# Patient Record
Sex: FEMALE | Race: WHITE | NOT HISPANIC OR LATINO | Employment: OTHER | URBAN - METROPOLITAN AREA
[De-identification: names, ages, dates, MRNs, and addresses within clinical notes are randomized per-mention and may not be internally consistent; named-entity substitution may affect disease eponyms.]

---

## 2017-04-18 ENCOUNTER — HOSPITAL ENCOUNTER (OUTPATIENT)
Dept: ONCOLOGY | Facility: CLINIC | Age: 67
Discharge: HOME | End: 2017-04-18
Attending: INTERNAL MEDICINE | Admitting: INTERNAL MEDICINE

## 2017-04-18 LAB
ALBUMIN SERPL-MCNC: 3.8 G/DL (ref 3.5–4.8)
ALBUMIN/GLOB SERPL: 1.2 {RATIO} (ref 1–1.7)
ALP SERPL-CCNC: 55 IU/L (ref 32–91)
ALT SERPL-CCNC: 25 IU/L (ref 14–54)
ANION GAP SERPL CALC-SCNC: 11.9 MMOL/L (ref 10–20)
AST SERPL-CCNC: 26 IU/L (ref 15–41)
BILIRUB SERPL-MCNC: 0.6 MG/DL (ref 0.3–1.2)
BUN SERPL-MCNC: 10 MG/DL (ref 8–20)
BUN/CREAT SERPL: 14.3 (ref 5.4–26.2)
CALCIUM SERPL-MCNC: 9.3 MG/DL (ref 8.9–10.3)
CHLORIDE SERPL-SCNC: 107 MMOL/L (ref 101–111)
CONV CO2: 28 MMOL/L (ref 22–32)
CONV TOTAL PROTEIN: 7 G/DL (ref 6.1–7.9)
CREAT UR-MCNC: 0.7 MG/DL (ref 0.4–1)
GLOBULIN UR ELPH-MCNC: 3.2 G/DL (ref 2.5–3.8)
GLUCOSE SERPL-MCNC: 74 MG/DL (ref 65–99)
POTASSIUM SERPL-SCNC: 3.9 MMOL/L (ref 3.6–5.1)
SODIUM SERPL-SCNC: 143 MMOL/L (ref 136–144)

## 2017-11-06 ENCOUNTER — HOSPITAL ENCOUNTER (OUTPATIENT)
Dept: ONCOLOGY | Facility: CLINIC | Age: 67
Setting detail: INFUSION SERIES
Discharge: HOME | End: 2017-11-06
Attending: INTERNAL MEDICINE | Admitting: INTERNAL MEDICINE

## 2017-11-06 ENCOUNTER — CLINICAL SUPPORT (OUTPATIENT)
Dept: ONCOLOGY | Facility: HOSPITAL | Age: 67
End: 2017-11-06

## 2017-11-06 ENCOUNTER — HOSPITAL ENCOUNTER (OUTPATIENT)
Dept: ONCOLOGY | Facility: HOSPITAL | Age: 67
Discharge: HOME | End: 2017-11-06
Attending: INTERNAL MEDICINE | Admitting: INTERNAL MEDICINE

## 2017-11-06 LAB
ALBUMIN SERPL-MCNC: 4 G/DL (ref 3.5–4.8)
ALBUMIN/GLOB SERPL: 1.4 {RATIO} (ref 1–1.7)
ALP SERPL-CCNC: 44 IU/L (ref 32–91)
ALT SERPL-CCNC: 25 IU/L (ref 14–54)
ANION GAP SERPL CALC-SCNC: 11.1 MMOL/L (ref 10–20)
AST SERPL-CCNC: 30 IU/L (ref 15–41)
BILIRUB SERPL-MCNC: 0.7 MG/DL (ref 0.3–1.2)
BUN SERPL-MCNC: 17 MG/DL (ref 8–20)
BUN/CREAT SERPL: 24.3 (ref 5.4–26.2)
CALCIUM SERPL-MCNC: 9.2 MG/DL (ref 8.9–10.3)
CHLORIDE SERPL-SCNC: 105 MMOL/L (ref 101–111)
CONV CO2: 26 MMOL/L (ref 22–32)
CONV TOTAL PROTEIN: 6.8 G/DL (ref 6.1–7.9)
CREAT UR-MCNC: 0.7 MG/DL (ref 0.4–1)
GLOBULIN UR ELPH-MCNC: 2.8 G/DL (ref 2.5–3.8)
GLUCOSE SERPL-MCNC: 103 MG/DL (ref 65–99)
POTASSIUM SERPL-SCNC: 4.1 MMOL/L (ref 3.6–5.1)
SODIUM SERPL-SCNC: 138 MMOL/L (ref 136–144)

## 2017-11-06 NOTE — PROGRESS NOTES
PATIENTS ONCOLOGY RECORD LOCATED IN [facility]      Subjective     Name:  JCARLOS BOWEN     Date:  2017  Address:  [address] [place] [address] [address] [place] [address]  Home: [phone]  :  1950 AGE:  66 y.o.        RECORDS OBTAINED:  Patients Oncology Record is located in [facility]

## 2017-12-12 ENCOUNTER — HOSPITAL ENCOUNTER (OUTPATIENT)
Dept: MAMMOGRAPHY | Facility: HOSPITAL | Age: 67
Discharge: HOME | End: 2017-12-12
Attending: INTERNAL MEDICINE | Admitting: INTERNAL MEDICINE

## 2018-05-14 ENCOUNTER — CLINICAL SUPPORT (OUTPATIENT)
Dept: ONCOLOGY | Facility: HOSPITAL | Age: 68
End: 2018-05-14

## 2018-05-14 ENCOUNTER — HOSPITAL ENCOUNTER (OUTPATIENT)
Dept: ONCOLOGY | Facility: HOSPITAL | Age: 68
Discharge: HOME | End: 2018-05-14
Attending: INTERNAL MEDICINE | Admitting: INTERNAL MEDICINE

## 2018-05-14 ENCOUNTER — HOSPITAL ENCOUNTER (OUTPATIENT)
Dept: ONCOLOGY | Facility: CLINIC | Age: 68
Setting detail: INFUSION SERIES
Discharge: HOME | End: 2018-05-14
Attending: INTERNAL MEDICINE | Admitting: INTERNAL MEDICINE

## 2018-05-14 LAB
ALBUMIN SERPL-MCNC: 4 G/DL (ref 3.5–4.8)
ALBUMIN/GLOB SERPL: 1.3 {RATIO} (ref 1–1.7)
ALP SERPL-CCNC: 54 IU/L (ref 32–91)
ALT SERPL-CCNC: 24 IU/L (ref 14–54)
ANION GAP SERPL CALC-SCNC: 13 MMOL/L (ref 10–20)
AST SERPL-CCNC: 26 IU/L (ref 15–41)
BILIRUB SERPL-MCNC: 0.6 MG/DL (ref 0.3–1.2)
BUN SERPL-MCNC: 17 MG/DL (ref 8–20)
BUN/CREAT SERPL: 24.3 (ref 5.4–26.2)
CALCIUM SERPL-MCNC: 9.5 MG/DL (ref 8.9–10.3)
CHLORIDE SERPL-SCNC: 104 MMOL/L (ref 101–111)
CONV CO2: 26 MMOL/L (ref 22–32)
CONV TOTAL PROTEIN: 7.2 G/DL (ref 6.1–7.9)
CREAT UR-MCNC: 0.7 MG/DL (ref 0.4–1)
GLOBULIN UR ELPH-MCNC: 3.2 G/DL (ref 2.5–3.8)
GLUCOSE SERPL-MCNC: 99 MG/DL (ref 65–99)
IRON SATN MFR SERPL: 16 % (ref 15–50)
IRON SERPL-MCNC: 69 UG/DL (ref 28–170)
POTASSIUM SERPL-SCNC: 4 MMOL/L (ref 3.6–5.1)
SODIUM SERPL-SCNC: 139 MMOL/L (ref 136–144)
TIBC SERPL-MCNC: 426 UG/DL (ref 228–428)

## 2018-05-14 NOTE — PROGRESS NOTES
PATIENTS ONCOLOGY RECORD LOCATED IN [facility]      Subjective     Name:  JCARLOS BOWEN     Date:  2018  Address:  [address] [place], [place] [address] [place] [address]  Home: [phone]  :  1950 AGE:  67 y.o.        RECORDS OBTAINED:  Patients Oncology Record is located in [facility]

## 2018-05-16 ENCOUNTER — HOSPITAL ENCOUNTER (OUTPATIENT)
Dept: ONCOLOGY | Facility: CLINIC | Age: 68
Setting detail: INFUSION SERIES
Discharge: HOME | End: 2018-05-16
Attending: INTERNAL MEDICINE | Admitting: INTERNAL MEDICINE

## 2018-05-16 ENCOUNTER — CLINICAL SUPPORT (OUTPATIENT)
Dept: ONCOLOGY | Facility: HOSPITAL | Age: 68
End: 2018-05-16

## 2018-05-16 NOTE — PROGRESS NOTES
PATIENTS ONCOLOGY RECORD LOCATED IN [facility]      Subjective     Name:  JCARLOS BOWEN     Date:  2018  Address:  [address] [place], [place] [address] [place] [address]  Home: [phone]  :  1950 AGE:  67 y.o.        RECORDS OBTAINED:  Patients Oncology Record is located in [facility]

## 2018-05-17 ENCOUNTER — HOSPITAL ENCOUNTER (OUTPATIENT)
Dept: ONCOLOGY | Facility: CLINIC | Age: 68
Setting detail: INFUSION SERIES
Discharge: HOME | End: 2018-05-17
Attending: INTERNAL MEDICINE | Admitting: INTERNAL MEDICINE

## 2018-05-17 ENCOUNTER — CLINICAL SUPPORT (OUTPATIENT)
Dept: ONCOLOGY | Facility: HOSPITAL | Age: 68
End: 2018-05-17

## 2018-05-18 ENCOUNTER — CLINICAL SUPPORT (OUTPATIENT)
Dept: ONCOLOGY | Facility: HOSPITAL | Age: 68
End: 2018-05-18

## 2018-05-18 ENCOUNTER — HOSPITAL ENCOUNTER (OUTPATIENT)
Dept: ONCOLOGY | Facility: CLINIC | Age: 68
Setting detail: INFUSION SERIES
Discharge: HOME | End: 2018-05-18
Attending: INTERNAL MEDICINE | Admitting: INTERNAL MEDICINE

## 2018-05-18 NOTE — PROGRESS NOTES
PATIENTS ONCOLOGY RECORD LOCATED IN [facility]      Subjective     Name:  JCARLOS BOEWN     Date:  2018  Address:  [address] [place], [place] [address] [place] [address]  Home: [phone]  :  1950 AGE:  67 y.o.        RECORDS OBTAINED:  Patients Oncology Record is located in [facility] [[email or web address]]

## 2018-06-13 ENCOUNTER — OFFICE (OUTPATIENT)
Dept: URBAN - METROPOLITAN AREA CLINIC 64 | Facility: CLINIC | Age: 68
End: 2018-06-13
Payer: COMMERCIAL

## 2018-06-13 VITALS
SYSTOLIC BLOOD PRESSURE: 121 MMHG | HEART RATE: 85 BPM | DIASTOLIC BLOOD PRESSURE: 78 MMHG | HEIGHT: 64 IN | WEIGHT: 167 LBS

## 2018-06-13 DIAGNOSIS — K21.0 GASTRO-ESOPHAGEAL REFLUX DISEASE WITH ESOPHAGITIS: ICD-10-CM

## 2018-06-13 DIAGNOSIS — D64.9 ANEMIA, UNSPECIFIED: ICD-10-CM

## 2018-06-13 DIAGNOSIS — Z85.3 PERSONAL HISTORY OF MALIGNANT NEOPLASM OF BREAST: ICD-10-CM

## 2018-06-13 DIAGNOSIS — K58.0 IRRITABLE BOWEL SYNDROME WITH DIARRHEA: ICD-10-CM

## 2018-06-13 PROCEDURE — 99213 OFFICE O/P EST LOW 20 MIN: CPT | Performed by: INTERNAL MEDICINE

## 2018-06-18 ENCOUNTER — CLINICAL SUPPORT (OUTPATIENT)
Dept: ONCOLOGY | Facility: HOSPITAL | Age: 68
End: 2018-06-18

## 2018-06-18 ENCOUNTER — HOSPITAL ENCOUNTER (OUTPATIENT)
Dept: ONCOLOGY | Facility: CLINIC | Age: 68
Setting detail: INFUSION SERIES
Discharge: HOME | End: 2018-06-18
Attending: INTERNAL MEDICINE | Admitting: INTERNAL MEDICINE

## 2018-06-18 LAB — OCCULT BLOOD, FECAL, IA: NEGATIVE

## 2018-06-18 NOTE — PROGRESS NOTES
PATIENTS ONCOLOGY RECORD LOCATED IN [facility]      Subjective     Name:  JCARLOS BOWEN     Date:  2018  Address:  [address] [place], [place] [address] [place] [address]  Home: [phone]  :  1950 AGE:  67 y.o.        RECORDS OBTAINED:  Patients Oncology Record is located in [facility]

## 2018-07-18 ENCOUNTER — HOSPITAL ENCOUNTER (OUTPATIENT)
Dept: ONCOLOGY | Facility: CLINIC | Age: 68
Setting detail: INFUSION SERIES
Discharge: HOME | End: 2018-07-18
Attending: INTERNAL MEDICINE | Admitting: INTERNAL MEDICINE

## 2018-07-18 ENCOUNTER — CLINICAL SUPPORT (OUTPATIENT)
Dept: ONCOLOGY | Facility: HOSPITAL | Age: 68
End: 2018-07-18

## 2018-07-18 NOTE — PROGRESS NOTES
PATIENTS ONCOLOGY RECORD LOCATED IN [facility]      Subjective     Name:  JCARLOS BOWEN     Date:  2018  Address:  [address] [place], [place] [address] [place] [address]  Home: [phone]  :  1950 AGE:  67 y.o.        RECORDS OBTAINED:  Patients Oncology Record is located in [facility]

## 2018-08-14 ENCOUNTER — HOSPITAL ENCOUNTER (OUTPATIENT)
Dept: ONCOLOGY | Facility: CLINIC | Age: 68
Setting detail: INFUSION SERIES
Discharge: HOME | End: 2018-08-14
Attending: INTERNAL MEDICINE | Admitting: INTERNAL MEDICINE

## 2018-08-14 ENCOUNTER — CLINICAL SUPPORT (OUTPATIENT)
Dept: ONCOLOGY | Facility: HOSPITAL | Age: 68
End: 2018-08-14

## 2018-08-14 ENCOUNTER — HOSPITAL ENCOUNTER (OUTPATIENT)
Dept: ONCOLOGY | Facility: HOSPITAL | Age: 68
Discharge: HOME | End: 2018-08-14
Attending: INTERNAL MEDICINE | Admitting: INTERNAL MEDICINE

## 2018-08-14 LAB
ALBUMIN SERPL-MCNC: 3.9 G/DL (ref 3.5–4.8)
ALBUMIN/GLOB SERPL: 1.3 {RATIO} (ref 1–1.7)
ALP SERPL-CCNC: 52 IU/L (ref 32–91)
ALT SERPL-CCNC: 24 IU/L (ref 14–54)
ANION GAP SERPL CALC-SCNC: 11.9 MMOL/L (ref 10–20)
AST SERPL-CCNC: 25 IU/L (ref 15–41)
BILIRUB SERPL-MCNC: 0.6 MG/DL (ref 0.3–1.2)
BUN SERPL-MCNC: 18 MG/DL (ref 8–20)
BUN/CREAT SERPL: 30 (ref 5.4–26.2)
CALCIUM SERPL-MCNC: 9.2 MG/DL (ref 8.9–10.3)
CHLORIDE SERPL-SCNC: 106 MMOL/L (ref 101–111)
CONV CO2: 25 MMOL/L (ref 22–32)
CONV TOTAL PROTEIN: 7 G/DL (ref 6.1–7.9)
CREAT UR-MCNC: 0.6 MG/DL (ref 0.4–1)
GLOBULIN UR ELPH-MCNC: 3.1 G/DL (ref 2.5–3.8)
GLUCOSE SERPL-MCNC: 95 MG/DL (ref 65–99)
POTASSIUM SERPL-SCNC: 3.9 MMOL/L (ref 3.6–5.1)
SODIUM SERPL-SCNC: 139 MMOL/L (ref 136–144)

## 2018-08-14 NOTE — PROGRESS NOTES
PATIENTS ONCOLOGY RECORD LOCATED IN [facility]      Subjective     Name:  JCARLOS BOWEN     Date:  2018  Address:  [address] [place], [place] [address] [place] [address]  Home: [phone]  :  1950 AGE:  67 y.o.        RECORDS OBTAINED:  Patients Oncology Record is located in [facility]

## 2018-08-29 ENCOUNTER — OFFICE (OUTPATIENT)
Dept: URBAN - METROPOLITAN AREA CLINIC 64 | Facility: CLINIC | Age: 68
End: 2018-08-29
Payer: COMMERCIAL

## 2018-08-29 VITALS
DIASTOLIC BLOOD PRESSURE: 80 MMHG | SYSTOLIC BLOOD PRESSURE: 127 MMHG | WEIGHT: 163 LBS | HEIGHT: 64 IN | HEART RATE: 83 BPM

## 2018-08-29 DIAGNOSIS — D64.9 ANEMIA, UNSPECIFIED: ICD-10-CM

## 2018-08-29 PROCEDURE — 99212 OFFICE O/P EST SF 10 MIN: CPT | Performed by: INTERNAL MEDICINE

## 2018-09-11 ENCOUNTER — HOSPITAL ENCOUNTER (OUTPATIENT)
Dept: ONCOLOGY | Facility: CLINIC | Age: 68
Setting detail: INFUSION SERIES
Discharge: HOME | End: 2018-09-11
Attending: INTERNAL MEDICINE | Admitting: INTERNAL MEDICINE

## 2018-09-11 ENCOUNTER — CLINICAL SUPPORT (OUTPATIENT)
Dept: ONCOLOGY | Facility: HOSPITAL | Age: 68
End: 2018-09-11

## 2018-09-11 NOTE — PROGRESS NOTES
PATIENTS ONCOLOGY RECORD LOCATED IN [facility]      Subjective     Name:  JCARLOS BOWEN     Date:  2018  Address:  [address] [place], [place] [address] [place] [address]  Home: [phone]  :  1950 AGE:  67 y.o.        RECORDS OBTAINED:  Patients Oncology Record is located in [facility]

## 2018-10-17 ENCOUNTER — HOSPITAL ENCOUNTER (OUTPATIENT)
Dept: ONCOLOGY | Facility: CLINIC | Age: 68
Setting detail: INFUSION SERIES
Discharge: HOME | End: 2018-10-17
Attending: INTERNAL MEDICINE | Admitting: INTERNAL MEDICINE

## 2018-10-17 ENCOUNTER — CLINICAL SUPPORT (OUTPATIENT)
Dept: ONCOLOGY | Facility: HOSPITAL | Age: 68
End: 2018-10-17

## 2018-10-17 NOTE — PROGRESS NOTES
PATIENTS ONCOLOGY RECORD LOCATED IN [facility]      Subjective     Name:  JCARLOS BOWEN     Date:  10/17/2018  Address:  [address] [place], [place] [address] [place] [address]  Home: [phone]  :  1950 AGE:  67 y.o.        RECORDS OBTAINED:  Patients Oncology Record is located in [facility]

## 2018-11-13 ENCOUNTER — CLINICAL SUPPORT (OUTPATIENT)
Dept: ONCOLOGY | Facility: HOSPITAL | Age: 68
End: 2018-11-13

## 2018-11-13 ENCOUNTER — HOSPITAL ENCOUNTER (OUTPATIENT)
Dept: ONCOLOGY | Facility: CLINIC | Age: 68
Setting detail: INFUSION SERIES
Discharge: HOME | End: 2018-11-13
Attending: INTERNAL MEDICINE | Admitting: INTERNAL MEDICINE

## 2018-11-13 NOTE — PROGRESS NOTES
[facility]      Subjective     Name:  JCARLOS BOWEN     Date:  2018  Address:  [facility] [address] [place] [address]  Home: [unfilled]  :  1950 AGE:  67 y.o.        RECORDS OBTAINED:  Patients Oncology Record is located in [facility]

## 2018-12-11 ENCOUNTER — CLINICAL SUPPORT (OUTPATIENT)
Dept: ONCOLOGY | Facility: HOSPITAL | Age: 68
End: 2018-12-11

## 2018-12-11 ENCOUNTER — HOSPITAL ENCOUNTER (OUTPATIENT)
Dept: ONCOLOGY | Facility: CLINIC | Age: 68
Setting detail: INFUSION SERIES
Discharge: HOME | End: 2018-12-11
Attending: INTERNAL MEDICINE | Admitting: INTERNAL MEDICINE

## 2018-12-11 ENCOUNTER — HOSPITAL ENCOUNTER (OUTPATIENT)
Dept: ONCOLOGY | Facility: HOSPITAL | Age: 68
Discharge: HOME | End: 2018-12-11
Attending: INTERNAL MEDICINE | Admitting: INTERNAL MEDICINE

## 2018-12-11 LAB
ALBUMIN SERPL-MCNC: 3.9 G/DL (ref 3.5–4.8)
ALBUMIN/GLOB SERPL: 1.3 {RATIO} (ref 1–1.7)
ALP SERPL-CCNC: 46 IU/L (ref 32–91)
ALT SERPL-CCNC: 28 IU/L (ref 14–54)
ANION GAP SERPL CALC-SCNC: 11.2 MMOL/L (ref 10–20)
AST SERPL-CCNC: 29 IU/L (ref 15–41)
BILIRUB SERPL-MCNC: 0.1 MG/DL (ref 0.3–1.2)
BUN SERPL-MCNC: 12 MG/DL (ref 8–20)
BUN/CREAT SERPL: 17.1 (ref 5.4–26.2)
CALCIUM SERPL-MCNC: 9.2 MG/DL (ref 8.9–10.3)
CHLORIDE SERPL-SCNC: 107 MMOL/L (ref 101–111)
CONV CO2: 26 MMOL/L (ref 22–32)
CONV TOTAL PROTEIN: 6.9 G/DL (ref 6.1–7.9)
CREAT UR-MCNC: 0.7 MG/DL (ref 0.4–1)
GLOBULIN UR ELPH-MCNC: 3 G/DL (ref 2.5–3.8)
GLUCOSE SERPL-MCNC: 95 MG/DL (ref 65–99)
POTASSIUM SERPL-SCNC: 4.2 MMOL/L (ref 3.6–5.1)
SODIUM SERPL-SCNC: 140 MMOL/L (ref 136–144)

## 2018-12-11 NOTE — PROGRESS NOTES
[facility]      Subjective     Name:  JCARLOS BOWEN     Date:  2018  Address:  [facility] [address] [place] [address]  Home: [unfilled]  :  1950 AGE:  68 y.o.        RECORDS OBTAINED:  Patients Oncology Record is located in [facility]

## 2019-01-15 ENCOUNTER — HOSPITAL ENCOUNTER (OUTPATIENT)
Dept: ONCOLOGY | Facility: CLINIC | Age: 69
Setting detail: INFUSION SERIES
Discharge: HOME | End: 2019-01-15
Attending: INTERNAL MEDICINE | Admitting: INTERNAL MEDICINE

## 2019-01-15 ENCOUNTER — CLINICAL SUPPORT (OUTPATIENT)
Dept: ONCOLOGY | Facility: HOSPITAL | Age: 69
End: 2019-01-15

## 2019-01-15 NOTE — PROGRESS NOTES
[facility]      Subjective     Name:  JCARLOS BOWEN     Date:  01/15/2019  Address:  [facility] [address] [place] [address]  Home: [unfilled]  :  1950 AGE:  68 y.o.        RECORDS OBTAINED:  Patients Oncology Record is located in [facility]

## 2019-02-12 ENCOUNTER — CLINICAL SUPPORT (OUTPATIENT)
Dept: ONCOLOGY | Facility: HOSPITAL | Age: 69
End: 2019-02-12

## 2019-02-12 ENCOUNTER — HOSPITAL ENCOUNTER (OUTPATIENT)
Dept: ONCOLOGY | Facility: CLINIC | Age: 69
Setting detail: INFUSION SERIES
Discharge: HOME | End: 2019-02-12
Attending: INTERNAL MEDICINE | Admitting: INTERNAL MEDICINE

## 2019-02-12 NOTE — PROGRESS NOTES
[facility]      Subjective     Name:  JCARLOS BOWEN     Date:  2019  Address:  [facility] [address] [place] [address]  Home: [unfilled]  :  1950 AGE:  68 y.o.        RECORDS OBTAINED:  Patients Oncology Record is located in [facility]

## 2019-03-13 ENCOUNTER — HOSPITAL ENCOUNTER (OUTPATIENT)
Dept: ONCOLOGY | Facility: CLINIC | Age: 69
Setting detail: INFUSION SERIES
Discharge: HOME | End: 2019-03-13
Attending: INTERNAL MEDICINE | Admitting: INTERNAL MEDICINE

## 2019-03-13 ENCOUNTER — CLINICAL SUPPORT (OUTPATIENT)
Dept: ONCOLOGY | Facility: HOSPITAL | Age: 69
End: 2019-03-13

## 2019-03-13 ENCOUNTER — HOSPITAL ENCOUNTER (OUTPATIENT)
Dept: ONCOLOGY | Facility: HOSPITAL | Age: 69
Discharge: HOME | End: 2019-03-13
Attending: INTERNAL MEDICINE | Admitting: INTERNAL MEDICINE

## 2019-03-13 LAB
ALBUMIN SERPL-MCNC: 4.1 G/DL (ref 3.5–4.8)
ALBUMIN/GLOB SERPL: 1.3 {RATIO} (ref 1–1.7)
ALP SERPL-CCNC: 53 IU/L (ref 32–91)
ALT SERPL-CCNC: 31 IU/L (ref 14–54)
ANION GAP SERPL CALC-SCNC: 14.8 MMOL/L (ref 10–20)
AST SERPL-CCNC: 28 IU/L (ref 15–41)
BILIRUB SERPL-MCNC: 0.6 MG/DL (ref 0.3–1.2)
BUN SERPL-MCNC: 13 MG/DL (ref 8–20)
BUN/CREAT SERPL: 18.6 (ref 5.4–26.2)
CALCIUM SERPL-MCNC: 9.5 MG/DL (ref 8.9–10.3)
CHLORIDE SERPL-SCNC: 104 MMOL/L (ref 101–111)
CONV CO2: 23 MMOL/L (ref 22–32)
CONV TOTAL PROTEIN: 7.2 G/DL (ref 6.1–7.9)
CREAT UR-MCNC: 0.7 MG/DL (ref 0.4–1)
GLOBULIN UR ELPH-MCNC: 3.1 G/DL (ref 2.5–3.8)
GLUCOSE SERPL-MCNC: 101 MG/DL (ref 65–99)
POTASSIUM SERPL-SCNC: 3.8 MMOL/L (ref 3.6–5.1)
SODIUM SERPL-SCNC: 138 MMOL/L (ref 136–144)

## 2019-03-13 NOTE — PROGRESS NOTES
[facility]      Subjective     Name:  JCARLOS BOWEN     Date:  2019  Address:  [facility] [address] [place] [address]  Home: [unfilled]  :  1950 AGE:  68 y.o.        RECORDS OBTAINED:  Patients Oncology Record is located in [facility]

## 2019-04-09 ENCOUNTER — HOSPITAL ENCOUNTER (OUTPATIENT)
Dept: ONCOLOGY | Facility: CLINIC | Age: 69
Setting detail: INFUSION SERIES
Discharge: HOME | End: 2019-04-09
Attending: INTERNAL MEDICINE | Admitting: INTERNAL MEDICINE

## 2019-04-09 ENCOUNTER — CLINICAL SUPPORT (OUTPATIENT)
Dept: ONCOLOGY | Facility: HOSPITAL | Age: 69
End: 2019-04-09

## 2019-04-09 NOTE — PROGRESS NOTES
PATIENTS [facility]COL[facility]      Subjective     Name:  JCARLOS BOWEN     Date:  2019  Address:  [facility] [address] [place] [address]  Home: [unfilled]  :  1950 AGE:  68 y.o.        RECORDS OBTAINED:  Patients Oncology Record is located in [facility]

## 2019-05-07 ENCOUNTER — HOSPITAL ENCOUNTER (OUTPATIENT)
Dept: ONCOLOGY | Facility: CLINIC | Age: 69
Setting detail: INFUSION SERIES
Discharge: HOME | End: 2019-05-07
Attending: INTERNAL MEDICINE | Admitting: INTERNAL MEDICINE

## 2019-05-07 ENCOUNTER — CLINICAL SUPPORT (OUTPATIENT)
Dept: ONCOLOGY | Facility: HOSPITAL | Age: 69
End: 2019-05-07

## 2019-05-07 NOTE — PROGRESS NOTES
PATI[facility]      Subjective     Name:  JCARLOS BOWEN     Date:  2019  Address:  [facility] [address] [place] [address]  Home: [unfilled]  :  1950 AGE:  68 y.o.        RECORDS OBTAINED:  Patients Oncology Record is located in [facility]

## 2019-06-04 ENCOUNTER — HOSPITAL ENCOUNTER (OUTPATIENT)
Dept: ONCOLOGY | Facility: CLINIC | Age: 69
Setting detail: INFUSION SERIES
Discharge: HOME | End: 2019-06-04
Attending: INTERNAL MEDICINE | Admitting: INTERNAL MEDICINE

## 2019-06-19 VITALS — BODY MASS INDEX: 28.34 KG/M2 | WEIGHT: 166 LBS | HEIGHT: 64 IN

## 2019-06-19 PROBLEM — E53.8 B12 DEFICIENCY: Status: ACTIVE | Noted: 2019-06-19

## 2019-06-19 PROBLEM — M85.80 OSTEOPENIA: Status: ACTIVE | Noted: 2019-06-19

## 2019-06-20 ENCOUNTER — TELEPHONE (OUTPATIENT)
Dept: ONCOLOGY | Facility: HOSPITAL | Age: 69
End: 2019-06-20

## 2019-06-20 NOTE — TELEPHONE ENCOUNTER
Per Chanel Persaud, pt needs to have her Prolia appt changed.  Called and spoke w/ pt and she states that she will reschedule at her next visit on 7/8 when she comes in for her B12.  Appt for next week canceled and pt v/u.

## 2019-07-01 RX ORDER — LETROZOLE 2.5 MG/1
TABLET, FILM COATED ORAL
Qty: 30 TABLET | Refills: 2 | Status: SHIPPED | OUTPATIENT
Start: 2019-07-01 | End: 2020-12-03

## 2019-07-01 RX ORDER — CYANOCOBALAMIN 1000 UG/ML
INJECTION, SOLUTION INTRAMUSCULAR; SUBCUTANEOUS
Qty: 1 ML | Refills: 3 | Status: SHIPPED | OUTPATIENT
Start: 2019-07-01 | End: 2022-12-06

## 2019-07-03 DIAGNOSIS — E53.8 B12 DEFICIENCY: ICD-10-CM

## 2019-07-03 RX ORDER — CYANOCOBALAMIN 1000 UG/ML
1000 INJECTION, SOLUTION INTRAMUSCULAR; SUBCUTANEOUS
Status: CANCELLED | OUTPATIENT
Start: 2019-07-08

## 2019-07-08 ENCOUNTER — HOSPITAL ENCOUNTER (OUTPATIENT)
Dept: ONCOLOGY | Facility: HOSPITAL | Age: 69
Discharge: HOME | End: 2019-07-08
Admitting: NURSE PRACTITIONER

## 2019-07-08 VITALS
HEART RATE: 91 BPM | DIASTOLIC BLOOD PRESSURE: 83 MMHG | TEMPERATURE: 98.2 F | HEIGHT: 64 IN | RESPIRATION RATE: 18 BRPM | SYSTOLIC BLOOD PRESSURE: 149 MMHG | BODY MASS INDEX: 28.17 KG/M2 | WEIGHT: 165 LBS

## 2019-07-08 DIAGNOSIS — E53.8 B12 DEFICIENCY: Primary | ICD-10-CM

## 2019-07-08 PROCEDURE — 96372 THER/PROPH/DIAG INJ SC/IM: CPT

## 2019-07-08 PROCEDURE — 25010000002 CYANOCOBALAMIN PER 1000 MCG: Performed by: NURSE PRACTITIONER

## 2019-07-08 RX ORDER — CYANOCOBALAMIN 1000 UG/ML
1000 INJECTION, SOLUTION INTRAMUSCULAR; SUBCUTANEOUS
Status: DISCONTINUED | OUTPATIENT
Start: 2019-07-08 | End: 2019-07-09 | Stop reason: HOSPADM

## 2019-07-08 RX ADMIN — CYANOCOBALAMIN 1000 MCG: 1000 INJECTION, SOLUTION INTRAMUSCULAR; SUBCUTANEOUS at 10:00

## 2019-07-09 ENCOUNTER — TELEPHONE (OUTPATIENT)
Dept: ONCOLOGY | Facility: CLINIC | Age: 69
End: 2019-07-09

## 2019-07-09 NOTE — TELEPHONE ENCOUNTER
Refer to order 6-4-19   I faxed information over to [facility] and they will update appointment in [facility].

## 2019-08-12 DIAGNOSIS — E53.8 B12 DEFICIENCY: ICD-10-CM

## 2019-08-12 RX ORDER — CYANOCOBALAMIN 1000 UG/ML
1000 INJECTION, SOLUTION INTRAMUSCULAR; SUBCUTANEOUS
Status: CANCELLED | OUTPATIENT
Start: 2019-08-13

## 2019-08-13 ENCOUNTER — HOSPITAL ENCOUNTER (OUTPATIENT)
Dept: ONCOLOGY | Facility: HOSPITAL | Age: 69
Discharge: HOME | End: 2019-08-13
Admitting: NURSE PRACTITIONER

## 2019-08-13 VITALS
RESPIRATION RATE: 18 BRPM | WEIGHT: 164 LBS | TEMPERATURE: 98.1 F | HEIGHT: 64 IN | DIASTOLIC BLOOD PRESSURE: 88 MMHG | HEART RATE: 91 BPM | BODY MASS INDEX: 28 KG/M2 | SYSTOLIC BLOOD PRESSURE: 151 MMHG

## 2019-08-13 DIAGNOSIS — E53.8 B12 DEFICIENCY: Primary | ICD-10-CM

## 2019-08-13 PROCEDURE — 96372 THER/PROPH/DIAG INJ SC/IM: CPT | Performed by: INTERNAL MEDICINE

## 2019-08-13 PROCEDURE — 25010000002 CYANOCOBALAMIN PER 1000 MCG: Performed by: INTERNAL MEDICINE

## 2019-08-13 RX ORDER — CYANOCOBALAMIN 1000 UG/ML
1000 INJECTION, SOLUTION INTRAMUSCULAR; SUBCUTANEOUS
Status: DISCONTINUED | OUTPATIENT
Start: 2019-08-13 | End: 2019-08-14 | Stop reason: HOSPADM

## 2019-08-13 RX ADMIN — CYANOCOBALAMIN 1000 MCG: 1000 INJECTION, SOLUTION INTRAMUSCULAR; SUBCUTANEOUS at 10:02

## 2019-08-13 NOTE — ADDENDUM NOTE
Encounter addended by: Kristel Mckeon RN on: 8/13/2019 10:07 AM   Actions taken: Charge Capture section accepted

## 2019-09-09 DIAGNOSIS — M85.859 OSTEOPENIA OF NECK OF FEMUR, UNSPECIFIED LATERALITY: ICD-10-CM

## 2019-09-09 DIAGNOSIS — E53.8 B12 DEFICIENCY: ICD-10-CM

## 2019-09-09 RX ORDER — CYANOCOBALAMIN 1000 UG/ML
1000 INJECTION, SOLUTION INTRAMUSCULAR; SUBCUTANEOUS
Status: CANCELLED | OUTPATIENT
Start: 2019-10-08

## 2019-09-09 RX ORDER — CYANOCOBALAMIN 1000 UG/ML
1000 INJECTION, SOLUTION INTRAMUSCULAR; SUBCUTANEOUS
Status: CANCELLED | OUTPATIENT
Start: 2019-11-04

## 2019-09-09 RX ORDER — CYANOCOBALAMIN 1000 UG/ML
1000 INJECTION, SOLUTION INTRAMUSCULAR; SUBCUTANEOUS
Status: CANCELLED | OUTPATIENT
Start: 2019-12-02

## 2019-09-09 RX ORDER — CYANOCOBALAMIN 1000 UG/ML
1000 INJECTION, SOLUTION INTRAMUSCULAR; SUBCUTANEOUS
Status: CANCELLED | OUTPATIENT
Start: 2019-09-10

## 2019-09-10 ENCOUNTER — HOSPITAL ENCOUNTER (OUTPATIENT)
Dept: ONCOLOGY | Facility: HOSPITAL | Age: 69
Discharge: HOME | End: 2019-09-10
Admitting: NURSE PRACTITIONER

## 2019-09-10 VITALS
SYSTOLIC BLOOD PRESSURE: 139 MMHG | DIASTOLIC BLOOD PRESSURE: 81 MMHG | WEIGHT: 164 LBS | TEMPERATURE: 98 F | HEART RATE: 105 BPM | HEIGHT: 64 IN | BODY MASS INDEX: 28 KG/M2 | RESPIRATION RATE: 18 BRPM

## 2019-09-10 DIAGNOSIS — E53.8 B12 DEFICIENCY: ICD-10-CM

## 2019-09-10 DIAGNOSIS — M85.859 OSTEOPENIA OF NECK OF FEMUR, UNSPECIFIED LATERALITY: Primary | ICD-10-CM

## 2019-09-10 PROCEDURE — 25010000002 CYANOCOBALAMIN PER 1000 MCG: Performed by: INTERNAL MEDICINE

## 2019-09-10 PROCEDURE — 25010000002 DENOSUMAB 60 MG/ML SOLUTION PREFILLED SYRINGE: Performed by: NURSE PRACTITIONER

## 2019-09-10 PROCEDURE — 96372 THER/PROPH/DIAG INJ SC/IM: CPT | Performed by: INTERNAL MEDICINE

## 2019-09-10 RX ORDER — CYANOCOBALAMIN 1000 UG/ML
1000 INJECTION, SOLUTION INTRAMUSCULAR; SUBCUTANEOUS
Status: DISCONTINUED | OUTPATIENT
Start: 2019-09-10 | End: 2019-09-11 | Stop reason: HOSPADM

## 2019-09-10 RX ADMIN — DENOSUMAB 60 MG: 60 INJECTION SUBCUTANEOUS at 10:32

## 2019-09-10 RX ADMIN — CYANOCOBALAMIN 1000 MCG: 1000 INJECTION, SOLUTION INTRAMUSCULAR; SUBCUTANEOUS at 10:30

## 2019-09-30 DIAGNOSIS — C50.911 MALIGNANT NEOPLASM OF RIGHT FEMALE BREAST, UNSPECIFIED ESTROGEN RECEPTOR STATUS, UNSPECIFIED SITE OF BREAST (HCC): Primary | ICD-10-CM

## 2019-09-30 RX ORDER — LETROZOLE 2.5 MG/1
2.5 TABLET, FILM COATED ORAL DAILY
Qty: 30 TABLET | Refills: 11 | Status: SHIPPED | OUTPATIENT
Start: 2019-09-30 | End: 2019-12-03 | Stop reason: SDUPTHER

## 2019-09-30 RX ORDER — LETROZOLE 2.5 MG/1
TABLET, FILM COATED ORAL
Qty: 30 TABLET | Refills: 2 | OUTPATIENT
Start: 2019-09-30

## 2019-10-08 ENCOUNTER — HOSPITAL ENCOUNTER (OUTPATIENT)
Dept: ONCOLOGY | Facility: HOSPITAL | Age: 69
Discharge: HOME | End: 2019-10-08
Admitting: NURSE PRACTITIONER

## 2019-10-08 VITALS
BODY MASS INDEX: 28.34 KG/M2 | RESPIRATION RATE: 18 BRPM | SYSTOLIC BLOOD PRESSURE: 149 MMHG | DIASTOLIC BLOOD PRESSURE: 86 MMHG | TEMPERATURE: 98.2 F | WEIGHT: 166 LBS | HEART RATE: 88 BPM | HEIGHT: 64 IN

## 2019-10-08 DIAGNOSIS — E53.8 B12 DEFICIENCY: Primary | ICD-10-CM

## 2019-10-08 PROCEDURE — 25010000002 CYANOCOBALAMIN PER 1000 MCG: Performed by: INTERNAL MEDICINE

## 2019-10-08 PROCEDURE — 96372 THER/PROPH/DIAG INJ SC/IM: CPT | Performed by: INTERNAL MEDICINE

## 2019-10-08 RX ORDER — CYANOCOBALAMIN 1000 UG/ML
1000 INJECTION, SOLUTION INTRAMUSCULAR; SUBCUTANEOUS
Status: DISCONTINUED | OUTPATIENT
Start: 2019-10-08 | End: 2019-10-09 | Stop reason: HOSPADM

## 2019-10-08 RX ADMIN — CYANOCOBALAMIN 1000 MCG: 1000 INJECTION, SOLUTION INTRAMUSCULAR; SUBCUTANEOUS at 09:30

## 2019-11-04 ENCOUNTER — HOSPITAL ENCOUNTER (OUTPATIENT)
Dept: ONCOLOGY | Facility: HOSPITAL | Age: 69
Discharge: HOME | End: 2019-11-04
Admitting: NURSE PRACTITIONER

## 2019-11-04 VITALS
TEMPERATURE: 98 F | RESPIRATION RATE: 18 BRPM | DIASTOLIC BLOOD PRESSURE: 77 MMHG | HEART RATE: 102 BPM | HEIGHT: 64 IN | WEIGHT: 164 LBS | SYSTOLIC BLOOD PRESSURE: 106 MMHG | BODY MASS INDEX: 28 KG/M2

## 2019-11-04 DIAGNOSIS — E53.8 B12 DEFICIENCY: Primary | ICD-10-CM

## 2019-11-04 PROCEDURE — 25010000002 CYANOCOBALAMIN PER 1000 MCG: Performed by: INTERNAL MEDICINE

## 2019-11-04 PROCEDURE — 96372 THER/PROPH/DIAG INJ SC/IM: CPT | Performed by: INTERNAL MEDICINE

## 2019-11-04 RX ORDER — CYANOCOBALAMIN 1000 UG/ML
1000 INJECTION, SOLUTION INTRAMUSCULAR; SUBCUTANEOUS
Status: DISCONTINUED | OUTPATIENT
Start: 2019-11-04 | End: 2019-11-05 | Stop reason: HOSPADM

## 2019-11-04 RX ADMIN — CYANOCOBALAMIN 1000 MCG: 1000 INJECTION, SOLUTION INTRAMUSCULAR; SUBCUTANEOUS at 10:59

## 2019-12-02 NOTE — PROGRESS NOTES
Hematology/Oncology Outpatient Follow Up    PATIENT NAME:Tessa Kilpatrick  :1950  MRN: [id number]  PRIMARY CARE PHYSICIAN: Sharona Pineda MD  REFERRING PHYSICIAN: Sharona Pineda MD    Chief Complaint   Patient presents with   • Follow-up     Breast cancer   • Follow-up     Monitor drug toxicity        HISTORY OF PRESENT ILLNESS:   This is a 68-year-old female who in  was diagnosed with right breast cancer.  At the time patient was diagnosed with clinical stage III disease and underwent right modified mastectomy for invasive ductal carcinoma measuring 4.2 cm in size.  Seven out of nine lymph nodes were positive for metastatic disease.  Additional lymph node dissection revealed 6 out of 11 lymph nodes were positive for metastatic disease.  Her pathology stage was T2N3Mx.  Her right breast cancer was positive for estrogen receptor and negative for progesterone receptor, Ki-67 was positive at 36%.  HER-2/nickie by FISH was not amplified.  In 2009 patient received adjuvant chemotherapy with TAC which is combination of Adriamycin, Cytoxan and Taxotere for a total of eight cycles initiated in 3/26/09 and completed on 09.  This was subsequently followed by adjuvant radiation treatment and currently patient is on Femara since 2009.  She developed abnormal enhancement on her imaging studies and for that reason patient underwent prophylactic left mastectomy.  So far patient has tolerated Femara fairly well.  She was prescribed Arimidex briefly but due to intolerance this was discontinued.  Patient said she had her last bone density test approximately 18 months ago and this was within normal limits.  Patient has since them moved to this area and she wants to establish with medical oncology for continuity of care.    • 2009 - Patient underwent left simple mastectomy.  Pathology revealed benign breast tissue with fibrocystic changes and fibroadenoma.    • 4/22/15 - WBC 6.9,  hemoglobin 12.1, platelet count 231,000.  CMP showed normal liver function tests.  BUN 11.  Creatinine 0.6.   • 10/27/15 - Total cholesterol of 190,  (normal < 100), liver function tests (N).  • 11/3/15 - Anemia work up:  reticulocyte count (N) 0.91, B12 255, ferritin 78 (N), folate > 24, haptoglobin elevated to 207, LDH (N) 169, TIBC 404, serum iron (N) 55, iron saturation 14.  SPEP with immunofixation assay was negative for monoclonal gammopathy.    • 2/2/16 - Serum transferrin assay (N) 2.9, methylmalonic acid (N) 170.    • EGD and colonoscopy done revealed normal EGD and normal colonoscopy.  Follow up in 10 years was recommended for repeat colonoscopy.   • 3/24/16 - Comprehensive genetic analysis with [facility] which was essentially negative for any clinically significant mutation.    • 10/8/16 - Fasting lipid profile which showed a high cholesterol at 230, triglyceride (H) 226, LDL (H) 126.  Results were faxed to Dr. Pineda.  Chemistry panel:  BUN 13, creatinine 0.6.  Liver function tests (N).    • 12/12/17 - DEXA scan showed osteopenia.   • 5/14/18 - CBC:  WBC 4.9, hemoglobin 11.8, platelet count 249,000.  Differentials are 53% neutrophils, 29% lymphocytes.  There was no monocytosis, eosinophilia or basophilia.  B12 level was low-normal at 200.  Ferritin 38.  Folate normal 21.  BUN 17.  Creatinine 0.7.    • 5/16/18 - Urinalysis showed trace blood, otherwise negative.  Urine culture did not grow any significant organisms.    • 5/16/18 - Patient was placed on iron sulfate 325 mg p.o. daily and B12 injections.  She was also given a referral to GI.    • 6/13/18 - Patient seen by Dr. Donnelly.  Hemoccult stools were checked.   • Urinalysis at the last visit showed small blood.  Urine culture was negative for infection.  Patient was referred to Dr. Otilio Tong who she saw on 11/16/18.  Dr. Tong recommended CT scans of the abdomen and pelvis and also cystoscopy.    • 12/13/18 - Fasting lipid panel with total  cholesterol of 197, triglyceride 124, HDL was 60, LDL was 112.    Past Medical History:   Diagnosis Date   • Breast cancer (CMS/HCC)    • Fibromyalgia    • GERD (gastroesophageal reflux disease)    • Hypercholesteremia    • IBS (irritable bowel syndrome)    • Mitral valve prolapse    • Osteoarthritis    • Seasonal allergies        Past Surgical History:   Procedure Laterality Date   • APPENDECTOMY     • GANGLION CYST EXCISION      wrist   • HYSTERECTOMY     • MASTECTOMY           Current Outpatient Medications:   •  cyanocobalamin 1000 MCG/ML injection, INJECT 1 ML (CC) INTRAMUSCULARLY ONCE EVERY MONTH, Disp: 1 mL, Rfl: 3  •  diclofenac (VOLTAREN) 75 MG EC tablet, Take 75 mg by mouth 2 (Two) Times a Day., Disp: , Rfl: 4  •  dilTIAZem (TIAZAC) 180 MG 24 hr capsule, Take 180 mg by mouth Daily., Disp: , Rfl: 3  •  escitalopram (LEXAPRO) 10 MG tablet, Take 10 mg by mouth Daily., Disp: , Rfl: 4  •  letrozole (FEMARA) 2.5 MG tablet, TAKE 1 TABLET BY MOUTH ONCE DAILY, Disp: 30 tablet, Rfl: 2  •  montelukast (SINGULAIR) 10 MG tablet, Take 10 mg by mouth Daily., Disp: , Rfl: 3  •  omeprazole (priLOSEC) 40 MG capsule, TAKE 1 CAPSULE BY MOUTH ONCE DAILY FOR REFLUX, Disp: , Rfl: 12  •  pravastatin (PRAVACHOL) 80 MG tablet, Take 80 mg by mouth Daily., Disp: , Rfl: 4  No current facility-administered medications for this visit.     Facility-Administered Medications Ordered in Other Visits:   •  cyanocobalamin injection 1,000 mcg, 1,000 mcg, Intramuscular, Q28 Days, Lorraine Seth APRN    Allergies   Allergen Reactions   • Atenolol Unknown (See Comments)     Not known    • Codeine Unknown (See Comments)     Compounded drug    • Fiorinal [Butalbital-Aspirin-Caffeine] Unknown (See Comments)     Not known    • Morphine Unknown (See Comments)     Unknown    • Valium [Diazepam] Unknown (See Comments)     Not known        Family History   Problem Relation Age of Onset   • Breast cancer Paternal Aunt 75       Cancer-related family  "history includes Breast cancer (age of onset: 75) in her paternal aunt.    Social History     Tobacco Use   • Smoking status: Former Smoker     Types: Cigarettes   • Tobacco comment: 40 years ago    Substance Use Topics   • Alcohol use: No     Frequency: Never     Drinks per session: Patient refused     Binge frequency: Patient refused   • Drug use: No       I have reviewed the history of present illness, past medical history, family history, social history, lab results, all notes and other records since the patient was last seen on 9/28/2019.    SUBJECTIVE:  The patient is here for a follow up appointment.  The patient states that she has continued to take Femara and Oscal D.  The patient denies any new chest wall complaints.          REVIEW OF SYSTEMS:  Review of Systems   Constitutional: Negative for chills and fever.   HENT: Negative for ear pain, mouth sores, nosebleeds and sore throat.    Eyes: Negative for photophobia and visual disturbance.   Respiratory: Negative for wheezing and stridor.    Cardiovascular: Negative for chest pain and palpitations.   Gastrointestinal: Negative for abdominal pain, diarrhea, nausea and vomiting.   Endocrine: Negative for cold intolerance and heat intolerance.   Genitourinary: Negative for dysuria and hematuria.   Musculoskeletal: Negative for joint swelling and neck stiffness.   Skin: Negative for color change and rash.   Neurological: Negative for seizures and syncope.   Hematological: Negative for adenopathy.        No obvious bleeding   Psychiatric/Behavioral: Negative for agitation, confusion and hallucinations.       OBJECTIVE:    Vitals:    12/03/19 0937   BP: 138/85   Pulse: 93   Resp: 16   Temp: 98.3 °F (36.8 °C)   Weight: 75.1 kg (165 lb 9.6 oz)   Height: 162.6 cm (64\")   PainSc: 0-No pain       ECOG  (0) Fully active, able to carry on all predisease performance without restriction    Physical Exam   Constitutional: She is oriented to person, place, and time. No " distress.   HENT:   Head: Normocephalic and atraumatic.   Eyes: Conjunctivae and EOM are normal. Right eye exhibits no discharge. Left eye exhibits no discharge. No scleral icterus.   Neck: Normal range of motion. Neck supple. No thyromegaly present.   Cardiovascular: Normal rate, regular rhythm and normal heart sounds. Exam reveals no gallop and no friction rub.   Pulmonary/Chest: Effort normal. No stridor. No respiratory distress. She has no wheezes. She exhibits no mass, no tenderness, no edema and no swelling.   Bilateral mastectomy - chest wall exam was negative  Bilateral axillary exam was negative   Abdominal: Soft. Bowel sounds are normal. She exhibits no mass. There is no tenderness. There is no rebound and no guarding.   Musculoskeletal: Normal range of motion. She exhibits no tenderness.   Lymphadenopathy:     She has no cervical adenopathy.   Neurological: She is alert and oriented to person, place, and time. She exhibits normal muscle tone.   Skin: Skin is warm. No rash noted. She is not diaphoretic. No erythema.   Psychiatric: She has a normal mood and affect. Her behavior is normal.   Nursing note and vitals reviewed.      RECENT LABS  WBC   Date Value Ref Range Status   12/03/2019 6.42 3.40 - 10.80 10*3/mm3 Final     RBC   Date Value Ref Range Status   12/03/2019 4.34 3.77 - 5.28 10*6/mm3 Final     Hemoglobin   Date Value Ref Range Status   12/03/2019 12.8 12.0 - 15.9 g/dL Final     Hematocrit   Date Value Ref Range Status   12/03/2019 39.1 34.0 - 46.6 % Final     MCV   Date Value Ref Range Status   12/03/2019 90.1 79.0 - 97.0 fL Final     MCH   Date Value Ref Range Status   12/03/2019 29.5 26.6 - 33.0 pg Final     MCHC   Date Value Ref Range Status   12/03/2019 32.7 31.5 - 35.7 g/dL Final     RDW   Date Value Ref Range Status   12/03/2019 12.5 12.3 - 15.4 % Final     RDW-SD   Date Value Ref Range Status   12/03/2019 40.8 37.0 - 54.0 fl Final     MPV   Date Value Ref Range Status   12/03/2019 9.2 6.0  - 12.0 fL Final     Platelets   Date Value Ref Range Status   12/03/2019 296 140 - 450 10*3/mm3 Final     Neutrophil %   Date Value Ref Range Status   12/03/2019 57.8 42.7 - 76.0 % Final     Lymphocyte %   Date Value Ref Range Status   12/03/2019 28.7 19.6 - 45.3 % Final     Monocyte %   Date Value Ref Range Status   12/03/2019 8.7 5.0 - 12.0 % Final     Eosinophil %   Date Value Ref Range Status   12/03/2019 3.9 0.3 - 6.2 % Final     Basophil %   Date Value Ref Range Status   12/03/2019 0.9 0.0 - 1.5 % Final     Neutrophils, Absolute   Date Value Ref Range Status   12/03/2019 3.71 1.70 - 7.00 10*3/mm3 Final     Lymphocytes, Absolute   Date Value Ref Range Status   12/03/2019 1.84 0.70 - 3.10 10*3/mm3 Final     Monocytes, Absolute   Date Value Ref Range Status   12/03/2019 0.56 0.10 - 0.90 10*3/mm3 Final     Eosinophils, Absolute   Date Value Ref Range Status   12/03/2019 0.25 0.00 - 0.40 10*3/mm3 Final     Basophils, Absolute   Date Value Ref Range Status   12/03/2019 0.06 0.00 - 0.20 10*3/mm3 Final       Lab Results   Component Value Date    GLUCOSE 87 12/03/2019    BUN 13 12/03/2019    CREATININE 0.81 12/03/2019    EGFRIFNONA 71 12/03/2019    BCR 16.0 12/03/2019    K 4.0 12/03/2019    CO2 25.0 12/03/2019    CALCIUM 9.1 12/03/2019    ALBUMIN 4.50 12/03/2019    LABIL2 1.3 03/13/2019    AST 25 12/03/2019    ALT 23 12/03/2019         Assessment/Plan     Malignant neoplasm of right female breast, unspecified estrogen receptor status, unspecified site of breast (CMS/HCC)  - CBC & Differential  - Comprehensive Metabolic Panel  - CBC Auto Differential      ASSESSMENT:    1. Clinical stage III invasive ductal carcinoma involving the right breast status post right modified mastectomy followed by adjuvant chemotherapy with TAC for eight cycles, followed by adjuvant right chest wall and markie radiation and currently on Femara since November 2009.  So far patient does not have any clinical evidence of relapse disease.     2. Status post left prophylactic mastectomy with benign pathology.     3. Anemia secondary to iron deficiency and B12 deficiency, on B12 injection.   4. Comprehensive genetic analysis with [facility] was essentially negative for any clinically significant mutation.    5. Hypertriglyceridemia.    6. Osteopenia, on Prolia.    7. Hematuria.  Work up by Dr. Otilio Tong with [facility].     PLANS:     Patient will discontinue Femara, but continue Os-Phillip D .Prolia will discontinue after this month treatment.  She completed 10 years of adjuvant Femara in November 2019.   She remains off oral iron supplementation and her hemoglobin is stable. She will continue B12 injections.  I will see her again in six months.  She is due for her DEXA scan in December 2019.  If she has osteoporosis, will consider use of Evista for treatment.  I will schedule this at this time.         I have reviewed labs results, imaging, vitals, and medications with the patient today. Will follow up in 6 months with me.    Patient verbalized understanding and is in agreement of the above plan.    Part of this document was scribed by Connie Queen, [facility]KE, BSN.

## 2019-12-03 ENCOUNTER — OFFICE VISIT (OUTPATIENT)
Dept: ONCOLOGY | Facility: CLINIC | Age: 69
End: 2019-12-03

## 2019-12-03 ENCOUNTER — APPOINTMENT (OUTPATIENT)
Dept: LAB | Facility: HOSPITAL | Age: 69
End: 2019-12-03

## 2019-12-03 ENCOUNTER — HOSPITAL ENCOUNTER (OUTPATIENT)
Dept: ONCOLOGY | Facility: HOSPITAL | Age: 69
Discharge: HOME | End: 2019-12-03
Admitting: NURSE PRACTITIONER

## 2019-12-03 VITALS
HEART RATE: 93 BPM | SYSTOLIC BLOOD PRESSURE: 138 MMHG | WEIGHT: 165.6 LBS | BODY MASS INDEX: 28.27 KG/M2 | HEIGHT: 64 IN | TEMPERATURE: 98.3 F | DIASTOLIC BLOOD PRESSURE: 85 MMHG | RESPIRATION RATE: 16 BRPM

## 2019-12-03 DIAGNOSIS — M81.6 LOCALIZED OSTEOPOROSIS (LEQUESNE): ICD-10-CM

## 2019-12-03 DIAGNOSIS — M85.9 DISORDER OF BONE DENSITY AND STRUCTURE, UNSPECIFIED: ICD-10-CM

## 2019-12-03 DIAGNOSIS — C50.911 MALIGNANT NEOPLASM OF RIGHT FEMALE BREAST, UNSPECIFIED ESTROGEN RECEPTOR STATUS, UNSPECIFIED SITE OF BREAST (HCC): Primary | ICD-10-CM

## 2019-12-03 DIAGNOSIS — M85.859 OSTEOPENIA OF NECK OF FEMUR, UNSPECIFIED LATERALITY: ICD-10-CM

## 2019-12-03 DIAGNOSIS — E53.8 B12 DEFICIENCY: Primary | ICD-10-CM

## 2019-12-03 LAB
ALBUMIN SERPL-MCNC: 4.5 G/DL (ref 3.5–5.2)
ALBUMIN/GLOB SERPL: 1.5 G/DL
ALP SERPL-CCNC: 56 U/L (ref 39–117)
ALT SERPL W P-5'-P-CCNC: 23 U/L (ref 1–33)
ANION GAP SERPL CALCULATED.3IONS-SCNC: 13 MMOL/L (ref 5–15)
AST SERPL-CCNC: 25 U/L (ref 1–32)
BASOPHILS # BLD AUTO: 0.06 10*3/MM3 (ref 0–0.2)
BASOPHILS NFR BLD AUTO: 0.9 % (ref 0–1.5)
BILIRUB SERPL-MCNC: 0.4 MG/DL (ref 0.2–1.2)
BUN BLD-MCNC: 13 MG/DL (ref 8–23)
BUN/CREAT SERPL: 16 (ref 7–25)
CALCIUM SPEC-SCNC: 9.1 MG/DL (ref 8.6–10.5)
CHLORIDE SERPL-SCNC: 99 MMOL/L (ref 98–107)
CO2 SERPL-SCNC: 25 MMOL/L (ref 22–29)
CREAT BLD-MCNC: 0.81 MG/DL (ref 0.57–1)
DEPRECATED RDW RBC AUTO: 40.8 FL (ref 37–54)
EOSINOPHIL # BLD AUTO: 0.25 10*3/MM3 (ref 0–0.4)
EOSINOPHIL NFR BLD AUTO: 3.9 % (ref 0.3–6.2)
ERYTHROCYTE [DISTWIDTH] IN BLOOD BY AUTOMATED COUNT: 12.5 % (ref 12.3–15.4)
GFR SERPL CREATININE-BSD FRML MDRD: 71 ML/MIN/1.73
GLOBULIN UR ELPH-MCNC: 3.1 GM/DL
GLUCOSE BLD-MCNC: 87 MG/DL (ref 65–99)
HCT VFR BLD AUTO: 39.1 % (ref 34–46.6)
HGB BLD-MCNC: 12.8 G/DL (ref 12–15.9)
LYMPHOCYTES # BLD AUTO: 1.84 10*3/MM3 (ref 0.7–3.1)
LYMPHOCYTES NFR BLD AUTO: 28.7 % (ref 19.6–45.3)
MCH RBC QN AUTO: 29.5 PG (ref 26.6–33)
MCHC RBC AUTO-ENTMCNC: 32.7 G/DL (ref 31.5–35.7)
MCV RBC AUTO: 90.1 FL (ref 79–97)
MONOCYTES # BLD AUTO: 0.56 10*3/MM3 (ref 0.1–0.9)
MONOCYTES NFR BLD AUTO: 8.7 % (ref 5–12)
NEUTROPHILS # BLD AUTO: 3.71 10*3/MM3 (ref 1.7–7)
NEUTROPHILS NFR BLD AUTO: 57.8 % (ref 42.7–76)
PLATELET # BLD AUTO: 296 10*3/MM3 (ref 140–450)
PMV BLD AUTO: 9.2 FL (ref 6–12)
POTASSIUM BLD-SCNC: 4 MMOL/L (ref 3.5–5.2)
PROT SERPL-MCNC: 7.6 G/DL (ref 6–8.5)
RBC # BLD AUTO: 4.34 10*6/MM3 (ref 3.77–5.28)
SODIUM BLD-SCNC: 137 MMOL/L (ref 136–145)
WBC NRBC COR # BLD: 6.42 10*3/MM3 (ref 3.4–10.8)

## 2019-12-03 PROCEDURE — 96372 THER/PROPH/DIAG INJ SC/IM: CPT | Performed by: INTERNAL MEDICINE

## 2019-12-03 PROCEDURE — 85025 COMPLETE CBC W/AUTO DIFF WBC: CPT | Performed by: INTERNAL MEDICINE

## 2019-12-03 PROCEDURE — 80053 COMPREHEN METABOLIC PANEL: CPT | Performed by: INTERNAL MEDICINE

## 2019-12-03 PROCEDURE — 99215 OFFICE O/P EST HI 40 MIN: CPT | Performed by: INTERNAL MEDICINE

## 2019-12-03 PROCEDURE — 25010000002 CYANOCOBALAMIN PER 1000 MCG: Performed by: INTERNAL MEDICINE

## 2019-12-03 RX ORDER — ESCITALOPRAM OXALATE 10 MG/1
10 TABLET ORAL DAILY
Refills: 4 | COMMUNITY
Start: 2019-11-10

## 2019-12-03 RX ORDER — OMEPRAZOLE 40 MG/1
CAPSULE, DELAYED RELEASE ORAL
Refills: 12 | COMMUNITY
Start: 2019-11-17

## 2019-12-03 RX ORDER — PRAVASTATIN SODIUM 80 MG/1
80 TABLET ORAL DAILY
Refills: 4 | COMMUNITY
Start: 2019-11-29

## 2019-12-03 RX ORDER — DILTIAZEM HYDROCHLORIDE 180 MG/1
240 CAPSULE, EXTENDED RELEASE ORAL DAILY
Refills: 3 | COMMUNITY
Start: 2019-10-31 | End: 2021-06-03 | Stop reason: DRUGHIGH

## 2019-12-03 RX ORDER — CYANOCOBALAMIN 1000 UG/ML
1000 INJECTION, SOLUTION INTRAMUSCULAR; SUBCUTANEOUS
Status: DISCONTINUED | OUTPATIENT
Start: 2019-12-03 | End: 2019-12-03

## 2019-12-03 RX ORDER — DICLOFENAC SODIUM 75 MG/1
75 TABLET, DELAYED RELEASE ORAL 2 TIMES DAILY
Refills: 4 | COMMUNITY
Start: 2019-09-09

## 2019-12-03 RX ORDER — MONTELUKAST SODIUM 10 MG/1
10 TABLET ORAL DAILY
Refills: 3 | COMMUNITY
Start: 2019-09-30

## 2019-12-03 RX ORDER — CYANOCOBALAMIN 1000 UG/ML
1000 INJECTION, SOLUTION INTRAMUSCULAR; SUBCUTANEOUS
Status: CANCELLED | OUTPATIENT
Start: 2019-12-31

## 2019-12-03 RX ADMIN — CYANOCOBALAMIN 1000 MCG: 1000 INJECTION, SOLUTION INTRAMUSCULAR; SUBCUTANEOUS at 11:23

## 2019-12-27 ENCOUNTER — APPOINTMENT (OUTPATIENT)
Dept: BONE DENSITY | Facility: HOSPITAL | Age: 69
End: 2019-12-27

## 2019-12-27 ENCOUNTER — HOSPITAL ENCOUNTER (OUTPATIENT)
Dept: BONE DENSITY | Facility: HOSPITAL | Age: 69
Discharge: HOME | End: 2019-12-27
Admitting: NURSE PRACTITIONER

## 2019-12-27 DIAGNOSIS — M85.859 OSTEOPENIA OF NECK OF FEMUR, UNSPECIFIED LATERALITY: ICD-10-CM

## 2019-12-27 DIAGNOSIS — M81.6 LOCALIZED OSTEOPOROSIS (LEQUESNE): ICD-10-CM

## 2019-12-27 PROCEDURE — 77080 DXA BONE DENSITY AXIAL: CPT

## 2020-01-03 DIAGNOSIS — E53.8 B12 DEFICIENCY: ICD-10-CM

## 2020-01-03 RX ORDER — CYANOCOBALAMIN 1000 UG/ML
1000 INJECTION, SOLUTION INTRAMUSCULAR; SUBCUTANEOUS
Status: CANCELLED | OUTPATIENT
Start: 2020-01-07

## 2020-01-07 ENCOUNTER — HOSPITAL ENCOUNTER (OUTPATIENT)
Dept: ONCOLOGY | Facility: HOSPITAL | Age: 70
Discharge: HOME | End: 2020-01-07
Admitting: INTERNAL MEDICINE

## 2020-01-07 VITALS
BODY MASS INDEX: 28.2 KG/M2 | WEIGHT: 165.2 LBS | HEART RATE: 98 BPM | HEIGHT: 64 IN | TEMPERATURE: 98.5 F | RESPIRATION RATE: 18 BRPM | DIASTOLIC BLOOD PRESSURE: 81 MMHG | SYSTOLIC BLOOD PRESSURE: 139 MMHG

## 2020-01-07 DIAGNOSIS — E53.8 B12 DEFICIENCY: Primary | ICD-10-CM

## 2020-01-07 PROCEDURE — 25010000002 CYANOCOBALAMIN PER 1000 MCG: Performed by: INTERNAL MEDICINE

## 2020-01-07 PROCEDURE — 96372 THER/PROPH/DIAG INJ SC/IM: CPT | Performed by: INTERNAL MEDICINE

## 2020-01-07 RX ORDER — CYANOCOBALAMIN 1000 UG/ML
1000 INJECTION, SOLUTION INTRAMUSCULAR; SUBCUTANEOUS
Status: DISCONTINUED | OUTPATIENT
Start: 2020-01-07 | End: 2020-01-08 | Stop reason: HOSPADM

## 2020-01-07 RX ADMIN — CYANOCOBALAMIN 1000 MCG: 1000 INJECTION, SOLUTION INTRAMUSCULAR; SUBCUTANEOUS at 10:20

## 2020-02-04 ENCOUNTER — HOSPITAL ENCOUNTER (OUTPATIENT)
Dept: ONCOLOGY | Facility: HOSPITAL | Age: 70
Discharge: HOME | End: 2020-02-04
Admitting: NURSE PRACTITIONER

## 2020-02-04 VITALS
SYSTOLIC BLOOD PRESSURE: 134 MMHG | HEART RATE: 100 BPM | TEMPERATURE: 98.3 F | BODY MASS INDEX: 28.38 KG/M2 | HEIGHT: 64 IN | RESPIRATION RATE: 18 BRPM | DIASTOLIC BLOOD PRESSURE: 87 MMHG | WEIGHT: 166.2 LBS

## 2020-02-04 DIAGNOSIS — E53.8 B12 DEFICIENCY: Primary | ICD-10-CM

## 2020-02-04 PROCEDURE — 25010000002 CYANOCOBALAMIN PER 1000 MCG: Performed by: INTERNAL MEDICINE

## 2020-02-04 PROCEDURE — 96372 THER/PROPH/DIAG INJ SC/IM: CPT | Performed by: INTERNAL MEDICINE

## 2020-02-04 RX ORDER — CYANOCOBALAMIN 1000 UG/ML
1000 INJECTION, SOLUTION INTRAMUSCULAR; SUBCUTANEOUS
Status: DISCONTINUED | OUTPATIENT
Start: 2020-02-04 | End: 2020-02-05 | Stop reason: HOSPADM

## 2020-02-04 RX ADMIN — CYANOCOBALAMIN 1000 MCG: 1000 INJECTION, SOLUTION INTRAMUSCULAR; SUBCUTANEOUS at 09:32

## 2020-03-10 ENCOUNTER — HOSPITAL ENCOUNTER (OUTPATIENT)
Dept: ONCOLOGY | Facility: HOSPITAL | Age: 70
Setting detail: INFUSION SERIES
Discharge: HOME | End: 2020-03-10

## 2020-03-10 VITALS
RESPIRATION RATE: 20 BRPM | HEART RATE: 97 BPM | HEIGHT: 64 IN | BODY MASS INDEX: 28.39 KG/M2 | DIASTOLIC BLOOD PRESSURE: 79 MMHG | WEIGHT: 166.3 LBS | TEMPERATURE: 98.2 F | SYSTOLIC BLOOD PRESSURE: 144 MMHG

## 2020-03-10 DIAGNOSIS — E53.8 B12 DEFICIENCY: ICD-10-CM

## 2020-03-10 DIAGNOSIS — M85.859 OSTEOPENIA OF NECK OF FEMUR, UNSPECIFIED LATERALITY: Primary | ICD-10-CM

## 2020-03-10 LAB
ALBUMIN SERPL-MCNC: 4.1 G/DL (ref 3.5–5.2)
ALBUMIN/GLOB SERPL: 1.3 G/DL
ALP SERPL-CCNC: 57 U/L (ref 39–117)
ALT SERPL W P-5'-P-CCNC: 26 U/L (ref 1–33)
ANION GAP SERPL CALCULATED.3IONS-SCNC: 11 MMOL/L (ref 5–15)
AST SERPL-CCNC: 26 U/L (ref 1–32)
BASOPHILS # BLD AUTO: 0.08 10*3/MM3 (ref 0–0.2)
BASOPHILS NFR BLD AUTO: 1.3 % (ref 0–1.5)
BILIRUB SERPL-MCNC: 0.3 MG/DL (ref 0.2–1.2)
BUN BLD-MCNC: 14 MG/DL (ref 8–23)
BUN/CREAT SERPL: 19.2 (ref 7–25)
CALCIUM SPEC-SCNC: 9.4 MG/DL (ref 8.6–10.5)
CHLORIDE SERPL-SCNC: 104 MMOL/L (ref 98–107)
CO2 SERPL-SCNC: 25 MMOL/L (ref 22–29)
CREAT BLD-MCNC: 0.73 MG/DL (ref 0.57–1)
CREAT BLDA-MCNC: 0.8 MG/DL (ref 0.6–1.3)
DEPRECATED RDW RBC AUTO: 40 FL (ref 37–54)
EOSINOPHIL # BLD AUTO: 0.26 10*3/MM3 (ref 0–0.4)
EOSINOPHIL NFR BLD AUTO: 4.3 % (ref 0.3–6.2)
ERYTHROCYTE [DISTWIDTH] IN BLOOD BY AUTOMATED COUNT: 12.5 % (ref 12.3–15.4)
GFR SERPL CREATININE-BSD FRML MDRD: 80 ML/MIN/1.73
GLOBULIN UR ELPH-MCNC: 3.2 GM/DL
GLUCOSE BLD-MCNC: 103 MG/DL (ref 65–99)
HCT VFR BLD AUTO: 37.1 % (ref 34–46.6)
HGB BLD-MCNC: 12 G/DL (ref 12–15.9)
LYMPHOCYTES # BLD AUTO: 1.65 10*3/MM3 (ref 0.7–3.1)
LYMPHOCYTES NFR BLD AUTO: 27.5 % (ref 19.6–45.3)
MCH RBC QN AUTO: 29.1 PG (ref 26.6–33)
MCHC RBC AUTO-ENTMCNC: 32.3 G/DL (ref 31.5–35.7)
MCV RBC AUTO: 89.8 FL (ref 79–97)
MONOCYTES # BLD AUTO: 0.51 10*3/MM3 (ref 0.1–0.9)
MONOCYTES NFR BLD AUTO: 8.5 % (ref 5–12)
NEUTROPHILS # BLD AUTO: 3.51 10*3/MM3 (ref 1.7–7)
NEUTROPHILS NFR BLD AUTO: 58.4 % (ref 42.7–76)
PLATELET # BLD AUTO: 284 10*3/MM3 (ref 140–450)
PMV BLD AUTO: 9.2 FL (ref 6–12)
POTASSIUM BLD-SCNC: 4 MMOL/L (ref 3.5–5.2)
PROT SERPL-MCNC: 7.3 G/DL (ref 6–8.5)
RBC # BLD AUTO: 4.13 10*6/MM3 (ref 3.77–5.28)
SODIUM BLD-SCNC: 140 MMOL/L (ref 136–145)
WBC NRBC COR # BLD: 6.01 10*3/MM3 (ref 3.4–10.8)

## 2020-03-10 PROCEDURE — 36415 COLL VENOUS BLD VENIPUNCTURE: CPT

## 2020-03-10 PROCEDURE — 82565 ASSAY OF CREATININE: CPT

## 2020-03-10 PROCEDURE — 25010000002 CYANOCOBALAMIN PER 1000 MCG: Performed by: NURSE PRACTITIONER

## 2020-03-10 PROCEDURE — 96372 THER/PROPH/DIAG INJ SC/IM: CPT

## 2020-03-10 PROCEDURE — 85025 COMPLETE CBC W/AUTO DIFF WBC: CPT | Performed by: NURSE PRACTITIONER

## 2020-03-10 PROCEDURE — 25010000002 DENOSUMAB 60 MG/ML SOLUTION PREFILLED SYRINGE: Performed by: NURSE PRACTITIONER

## 2020-03-10 PROCEDURE — 80053 COMPREHEN METABOLIC PANEL: CPT | Performed by: NURSE PRACTITIONER

## 2020-03-10 RX ORDER — CYANOCOBALAMIN 1000 UG/ML
1000 INJECTION, SOLUTION INTRAMUSCULAR; SUBCUTANEOUS
Status: DISCONTINUED | OUTPATIENT
Start: 2020-03-10 | End: 2020-03-11 | Stop reason: HOSPADM

## 2020-03-10 RX ADMIN — DENOSUMAB 60 MG: 60 INJECTION SUBCUTANEOUS at 11:09

## 2020-03-10 RX ADMIN — CYANOCOBALAMIN 1000 MCG: 1000 INJECTION, SOLUTION INTRAMUSCULAR at 11:09

## 2020-04-07 ENCOUNTER — HOSPITAL ENCOUNTER (OUTPATIENT)
Dept: ONCOLOGY | Facility: HOSPITAL | Age: 70
Discharge: HOME | End: 2020-04-07
Admitting: INTERNAL MEDICINE

## 2020-04-07 VITALS
DIASTOLIC BLOOD PRESSURE: 74 MMHG | SYSTOLIC BLOOD PRESSURE: 134 MMHG | RESPIRATION RATE: 18 BRPM | BODY MASS INDEX: 28.65 KG/M2 | HEART RATE: 91 BPM | WEIGHT: 167.8 LBS | HEIGHT: 64 IN | TEMPERATURE: 98.9 F

## 2020-04-07 DIAGNOSIS — E53.8 B12 DEFICIENCY: Primary | ICD-10-CM

## 2020-04-07 PROCEDURE — 25010000002 CYANOCOBALAMIN PER 1000 MCG: Performed by: INTERNAL MEDICINE

## 2020-04-07 PROCEDURE — 96372 THER/PROPH/DIAG INJ SC/IM: CPT

## 2020-04-07 RX ORDER — CYANOCOBALAMIN 1000 UG/ML
1000 INJECTION, SOLUTION INTRAMUSCULAR; SUBCUTANEOUS
Status: DISCONTINUED | OUTPATIENT
Start: 2020-04-07 | End: 2020-04-08 | Stop reason: HOSPADM

## 2020-04-07 RX ADMIN — CYANOCOBALAMIN 1000 MCG: 1000 INJECTION, SOLUTION INTRAMUSCULAR at 09:45

## 2020-05-05 ENCOUNTER — HOSPITAL ENCOUNTER (OUTPATIENT)
Dept: ONCOLOGY | Facility: HOSPITAL | Age: 70
Discharge: HOME | End: 2020-05-05
Admitting: INTERNAL MEDICINE

## 2020-05-05 VITALS
WEIGHT: 167.4 LBS | HEART RATE: 90 BPM | BODY MASS INDEX: 28.58 KG/M2 | SYSTOLIC BLOOD PRESSURE: 132 MMHG | RESPIRATION RATE: 18 BRPM | TEMPERATURE: 97.1 F | DIASTOLIC BLOOD PRESSURE: 82 MMHG | HEIGHT: 64 IN

## 2020-05-05 DIAGNOSIS — E53.8 B12 DEFICIENCY: Primary | ICD-10-CM

## 2020-05-05 PROCEDURE — 25010000002 CYANOCOBALAMIN PER 1000 MCG: Performed by: INTERNAL MEDICINE

## 2020-05-05 PROCEDURE — 96372 THER/PROPH/DIAG INJ SC/IM: CPT

## 2020-05-05 RX ORDER — CYANOCOBALAMIN 1000 UG/ML
1000 INJECTION, SOLUTION INTRAMUSCULAR; SUBCUTANEOUS
Status: DISCONTINUED | OUTPATIENT
Start: 2020-05-05 | End: 2020-05-06 | Stop reason: HOSPADM

## 2020-05-05 RX ADMIN — CYANOCOBALAMIN 1000 MCG: 1000 INJECTION INTRAMUSCULAR; SUBCUTANEOUS at 09:54

## 2020-06-01 NOTE — PROGRESS NOTES
Hematology/Oncology Outpatient Follow Up    PATIENT NAME:Tessa Kilpatrick  :1950  MRN: [id number]  PRIMARY CARE PHYSICIAN: Sharona Pindea MD  REFERRING PHYSICIAN: Sharona Pineda MD    Chief Complaint   Patient presents with   • Follow-up   • Breast Cancer     Anemia        HISTORY OF PRESENT ILLNESS:     This is a 69-year-old female who in  was diagnosed with right breast cancer.  At the time patient was diagnosed with clinical stage III disease and underwent right modified mastectomy for invasive ductal carcinoma measuring 4.2 cm in size.  Seven out of nine lymph nodes were positive for metastatic disease.  Additional lymph node dissection revealed 6 out of 11 lymph nodes were positive for metastatic disease.  Her pathology stage was T2N3Mx.  Her right breast cancer was positive for estrogen receptor and negative for progesterone receptor, Ki-67 was positive at 36%.  HER-2/nickie by FISH was not amplified.  In 2009 patient received adjuvant chemotherapy with TAC which is combination of Adriamycin, Cytoxan and Taxotere for a total of eight cycles initiated in 3/26/09 and completed on 09.  This was subsequently followed by adjuvant radiation treatment and currently patient is on Femara since 2009.  She developed abnormal enhancement on her imaging studies and for that reason patient underwent prophylactic left mastectomy.  So far patient has tolerated Femara fairly well.  She was prescribed Arimidex briefly but due to intolerance this was discontinued.  Patient said she had her last bone density test approximately 18 months ago and this was within normal limits.  Patient has since them moved to this area and she wants to establish with medical oncology for continuity of care.    • 2009 - Patient underwent left simple mastectomy.  Pathology revealed benign breast tissue with fibrocystic changes and fibroadenoma.    • 4/22/15 - WBC 6.9, hemoglobin 12.1, platelet  count 231,000.  CMP showed normal liver function tests.  BUN 11.  Creatinine 0.6.   • 10/27/15 - Total cholesterol of 190,  (normal < 100), liver function tests (N).  • 11/3/15 - Anemia work up:  reticulocyte count (N) 0.91, B12 255, ferritin 78 (N), folate > 24, haptoglobin elevated to 207, LDH (N) 169, TIBC 404, serum iron (N) 55, iron saturation 14.  SPEP with immunofixation assay was negative for monoclonal gammopathy.    • 2/2/16 - Serum transferrin assay (N) 2.9, methylmalonic acid (N) 170.    • EGD and colonoscopy done revealed normal EGD and normal colonoscopy.  Follow up in 10 years was recommended for repeat colonoscopy.   • 3/24/16 - Comprehensive genetic analysis with [facility] which was essentially negative for any clinically significant mutation.    • 10/8/16 - Fasting lipid profile which showed a high cholesterol at 230, triglyceride (H) 226, LDL (H) 126.  Results were faxed to Dr. Pineda.  Chemistry panel:  BUN 13, creatinine 0.6.  Liver function tests (N).    • 12/12/17 - DEXA scan showed osteopenia.   • 5/14/18 - CBC:  WBC 4.9, hemoglobin 11.8, platelet count 249,000.  Differentials are 53% neutrophils, 29% lymphocytes.  There was no monocytosis, eosinophilia or basophilia.  B12 level was low-normal at 200.  Ferritin 38.  Folate normal 21.  BUN 17.  Creatinine 0.7.    • 5/16/18 - Urinalysis showed trace blood, otherwise negative.  Urine culture did not grow any significant organisms.    • 5/16/18 - Patient was placed on iron sulfate 325 mg p.o. daily and B12 injections.  She was also given a referral to GI.    • 6/13/18 - Patient seen by Dr. Donnelly.  Hemoccult stools were checked.   • Urinalysis at the last visit showed small blood.  Urine culture was negative for infection.  Patient was referred to Dr. Otilio Tong who she saw on 11/16/18.  Dr. Tong recommended CT scans of the abdomen and pelvis and also cystoscopy.    • 12/13/18 - Fasting lipid panel with total cholesterol of 197,  triglyceride 124, HDL was 60, LDL was 112.  • 12/27/19-Patient had bone density which showed osteopenia.    Past Medical History:   Diagnosis Date   • Breast cancer (CMS/HCC)    • Fibromyalgia    • GERD (gastroesophageal reflux disease)    • Hypercholesteremia    • IBS (irritable bowel syndrome)    • Mitral valve prolapse    • Osteoarthritis    • Seasonal allergies        Past Surgical History:   Procedure Laterality Date   • APPENDECTOMY     • GANGLION CYST EXCISION      wrist   • HYSTERECTOMY     • MASTECTOMY           Current Outpatient Medications:   •  cyanocobalamin 1000 MCG/ML injection, INJECT 1 ML (CC) INTRAMUSCULARLY ONCE EVERY MONTH, Disp: 1 mL, Rfl: 3  •  diclofenac (VOLTAREN) 75 MG EC tablet, Take 75 mg by mouth 2 (Two) Times a Day., Disp: , Rfl: 4  •  dilTIAZem (TIAZAC) 180 MG 24 hr capsule, Take 180 mg by mouth Daily., Disp: , Rfl: 3  •  escitalopram (LEXAPRO) 10 MG tablet, Take 10 mg by mouth Daily., Disp: , Rfl: 4  •  montelukast (SINGULAIR) 10 MG tablet, Take 10 mg by mouth Daily., Disp: , Rfl: 3  •  omeprazole (priLOSEC) 40 MG capsule, TAKE 1 CAPSULE BY MOUTH ONCE DAILY FOR REFLUX, Disp: , Rfl: 12  •  pravastatin (PRAVACHOL) 80 MG tablet, Take 80 mg by mouth Daily., Disp: , Rfl: 4  •  letrozole (FEMARA) 2.5 MG tablet, TAKE 1 TABLET BY MOUTH ONCE DAILY, Disp: 30 tablet, Rfl: 2    Allergies   Allergen Reactions   • Atenolol Unknown (See Comments)     Not known    • Codeine Unknown (See Comments)     Compounded drug    • Fiorinal [Butalbital-Aspirin-Caffeine] Unknown (See Comments)     Not known    • Morphine Unknown (See Comments)     Unknown    • Valium [Diazepam] Unknown (See Comments)     Not known        Family History   Problem Relation Age of Onset   • Breast cancer Paternal Aunt 75       Cancer-related family history includes Breast cancer (age of onset: 75) in her paternal aunt.    Social History     Tobacco Use   • Smoking status: Former Smoker     Types: Cigarettes   • Tobacco comment: 40  "years ago    Substance Use Topics   • Alcohol use: No     Frequency: Never     Drinks per session: Patient refused     Binge frequency: Patient refused   • Drug use: No       I have reviewed and confirmed the accuracy of the patient's history: Chief complaint, HPI and ROS as entered by the MA/LPN/RN. Elisa Jacob MD 06/02/20    SUBJECTIVE:  The patient is here for a follow up appointment.  Has no specific complaints today.  She feels well.  She denies any fevers, chills, nausea or vomiting.  She denies any chest wall lumps or nodules.          REVIEW OF SYSTEMS:  Review of Systems   Constitutional: Negative for chills and fever.   HENT: Negative for ear pain, mouth sores, nosebleeds and sore throat.    Eyes: Negative for photophobia and visual disturbance.   Respiratory: Negative for wheezing and stridor.    Cardiovascular: Negative for chest pain and palpitations.   Gastrointestinal: Negative for abdominal pain, diarrhea, nausea and vomiting.   Endocrine: Negative for cold intolerance and heat intolerance.   Genitourinary: Negative for dysuria and hematuria.   Musculoskeletal: Negative for joint swelling and neck stiffness.   Skin: Negative for color change and rash.   Neurological: Negative for seizures and syncope.   Hematological: Negative for adenopathy.        No obvious bleeding   Psychiatric/Behavioral: Negative for agitation, confusion and hallucinations.       OBJECTIVE:    Vitals:    06/02/20 0952   BP: 136/84   Pulse: 89   Resp: 18   Temp: 97.1 °F (36.2 °C)   TempSrc: Oral   Weight: 75.3 kg (166 lb)   Height: 165.1 cm (65\")   PainSc: 0-No pain       ECOG  (0) Fully active, able to carry on all predisease performance without restriction    Physical Exam   Constitutional: She is oriented to person, place, and time. No distress.   HENT:   Head: Normocephalic and atraumatic.   Eyes: Conjunctivae and EOM are normal. Right eye exhibits no discharge. Left eye exhibits no discharge. No scleral icterus. "   Neck: Normal range of motion. Neck supple. No thyromegaly present.   Cardiovascular: Normal rate, regular rhythm and normal heart sounds. Exam reveals no gallop and no friction rub.   Pulmonary/Chest: Effort normal. No stridor. No respiratory distress. She has no wheezes. She exhibits no mass, no tenderness, no edema and no swelling.   Bilateral mastectomy - chest wall exam was negative  Bilateral axillary exam was negative   Abdominal: Soft. Bowel sounds are normal. She exhibits no mass. There is no tenderness. There is no rebound and no guarding.   Musculoskeletal: Normal range of motion. She exhibits no tenderness.   Lymphadenopathy:     She has no cervical adenopathy.   Neurological: She is alert and oriented to person, place, and time. She exhibits normal muscle tone.   Skin: Skin is warm. No rash noted. She is not diaphoretic. No erythema.   Psychiatric: She has a normal mood and affect. Her behavior is normal.   Nursing note and vitals reviewed.    PE as above    RECENT LABS  WBC   Date Value Ref Range Status   06/02/2020 6.62 3.40 - 10.80 10*3/mm3 Final     RBC   Date Value Ref Range Status   06/02/2020 4.24 3.77 - 5.28 10*6/mm3 Final     Hemoglobin   Date Value Ref Range Status   06/02/2020 12.5 12.0 - 15.9 g/dL Final     Hematocrit   Date Value Ref Range Status   06/02/2020 38.3 34.0 - 46.6 % Final     MCV   Date Value Ref Range Status   06/02/2020 90.3 79.0 - 97.0 fL Final     MCH   Date Value Ref Range Status   06/02/2020 29.5 26.6 - 33.0 pg Final     MCHC   Date Value Ref Range Status   06/02/2020 32.6 31.5 - 35.7 g/dL Final     RDW   Date Value Ref Range Status   06/02/2020 12.5 12.3 - 15.4 % Final     RDW-SD   Date Value Ref Range Status   06/02/2020 40.8 37.0 - 54.0 fl Final     MPV   Date Value Ref Range Status   06/02/2020 9.1 6.0 - 12.0 fL Final     Platelets   Date Value Ref Range Status   06/02/2020 295 140 - 450 10*3/mm3 Final     Neutrophil %   Date Value Ref Range Status   06/02/2020 52.9  42.7 - 76.0 % Final     Lymphocyte %   Date Value Ref Range Status   06/02/2020 32.0 19.6 - 45.3 % Final     Monocyte %   Date Value Ref Range Status   06/02/2020 8.9 5.0 - 12.0 % Final     Eosinophil %   Date Value Ref Range Status   06/02/2020 5.6 0.3 - 6.2 % Final     Basophil %   Date Value Ref Range Status   06/02/2020 0.6 0.0 - 1.5 % Final     Neutrophils, Absolute   Date Value Ref Range Status   06/02/2020 3.50 1.70 - 7.00 10*3/mm3 Final     Lymphocytes, Absolute   Date Value Ref Range Status   06/02/2020 2.12 0.70 - 3.10 10*3/mm3 Final     Monocytes, Absolute   Date Value Ref Range Status   06/02/2020 0.59 0.10 - 0.90 10*3/mm3 Final     Eosinophils, Absolute   Date Value Ref Range Status   06/02/2020 0.37 0.00 - 0.40 10*3/mm3 Final     Basophils, Absolute   Date Value Ref Range Status   06/02/2020 0.04 0.00 - 0.20 10*3/mm3 Final       Lab Results   Component Value Date    GLUCOSE 88 06/02/2020    BUN 12 06/02/2020    CREATININE 0.66 06/02/2020    EGFRIFNONA 89 06/02/2020    BCR 18.2 06/02/2020    K 4.0 06/02/2020    CO2 28.0 06/02/2020    CALCIUM 9.4 06/02/2020    ALBUMIN 4.70 06/02/2020    LABIL2 1.3 03/13/2019    AST 31 06/02/2020    ALT 31 06/02/2020         Assessment/Plan     Malignant neoplasm of right female breast, unspecified estrogen receptor status, unspecified site of breast (CMS/[facility])  - CBC & Differential  - Comprehensive Metabolic Panel  - CBC Auto Differential      ASSESSMENT:    1. Clinical stage III invasive ductal carcinoma involving the right breast status post right modified mastectomy followed by adjuvant chemotherapy with TAC for eight cycles, followed by adjuvant right chest wall and markie radiation and currently on Femara since November 2009.  So far patient does not have any clinical evidence of relapse disease.    2. Status post left prophylactic mastectomy with benign pathology.     3. Anemia secondary to iron deficiency and B12 deficiency, on B12 injection.  Ongoing evaluation.   Stable  4. Comprehensive genetic analysis with [facility] was essentially negative for any clinically significant mutation.    5. Hypertriglyceridemia.  Primary care physician  6. Osteopenia, off Prolia now due to discontinuation of AI.    7. Hematuria.  Work up by Dr. Otilio Tong with [facility].     PLANS:     Patient  has discontinued Femara, but continue Os-Phillip D .No longer on Prolia.  She has completed 10 years of adjuvant Femara in November 2019.   She remains off oral iron supplementation and her hemoglobin is stable.   She will continue B12 injections.  I will see her again in six months.    She is due for her DEXA scan in December 2021.  If she  develops osteoporosis, will consider use of Evista for treatment.  I will schedule this at this time.    All questions answered        I have reviewed labs results, imaging, vitals, and medications with the patient today. Will follow up in 6 months with me.    Patient verbalized understanding and is in agreement of the above plan.

## 2020-06-02 ENCOUNTER — HOSPITAL ENCOUNTER (OUTPATIENT)
Dept: ONCOLOGY | Facility: HOSPITAL | Age: 70
Discharge: HOME | End: 2020-06-02
Admitting: INTERNAL MEDICINE

## 2020-06-02 ENCOUNTER — OFFICE VISIT (OUTPATIENT)
Dept: ONCOLOGY | Facility: CLINIC | Age: 70
End: 2020-06-02

## 2020-06-02 ENCOUNTER — APPOINTMENT (OUTPATIENT)
Dept: LAB | Facility: HOSPITAL | Age: 70
End: 2020-06-02

## 2020-06-02 VITALS
RESPIRATION RATE: 18 BRPM | HEIGHT: 65 IN | HEART RATE: 89 BPM | DIASTOLIC BLOOD PRESSURE: 84 MMHG | BODY MASS INDEX: 27.66 KG/M2 | TEMPERATURE: 97.1 F | WEIGHT: 166 LBS | SYSTOLIC BLOOD PRESSURE: 136 MMHG

## 2020-06-02 DIAGNOSIS — C50.911 MALIGNANT NEOPLASM OF RIGHT FEMALE BREAST, UNSPECIFIED ESTROGEN RECEPTOR STATUS, UNSPECIFIED SITE OF BREAST (HCC): Primary | ICD-10-CM

## 2020-06-02 LAB
ALBUMIN SERPL-MCNC: 4.7 G/DL (ref 3.5–5.2)
ALBUMIN/GLOB SERPL: 1.5 G/DL
ALP SERPL-CCNC: 57 U/L (ref 39–117)
ALT SERPL W P-5'-P-CCNC: 31 U/L (ref 1–33)
ANION GAP SERPL CALCULATED.3IONS-SCNC: 9 MMOL/L (ref 5–15)
AST SERPL-CCNC: 31 U/L (ref 1–32)
BASOPHILS # BLD AUTO: 0.04 10*3/MM3 (ref 0–0.2)
BASOPHILS NFR BLD AUTO: 0.6 % (ref 0–1.5)
BILIRUB SERPL-MCNC: 0.4 MG/DL (ref 0.2–1.2)
BUN BLD-MCNC: 12 MG/DL (ref 8–23)
BUN/CREAT SERPL: 18.2 (ref 7–25)
CALCIUM SPEC-SCNC: 9.4 MG/DL (ref 8.6–10.5)
CHLORIDE SERPL-SCNC: 102 MMOL/L (ref 98–107)
CO2 SERPL-SCNC: 28 MMOL/L (ref 22–29)
CREAT BLD-MCNC: 0.66 MG/DL (ref 0.57–1)
DEPRECATED RDW RBC AUTO: 40.8 FL (ref 37–54)
EOSINOPHIL # BLD AUTO: 0.37 10*3/MM3 (ref 0–0.4)
EOSINOPHIL NFR BLD AUTO: 5.6 % (ref 0.3–6.2)
ERYTHROCYTE [DISTWIDTH] IN BLOOD BY AUTOMATED COUNT: 12.5 % (ref 12.3–15.4)
GFR SERPL CREATININE-BSD FRML MDRD: 89 ML/MIN/1.73
GLOBULIN UR ELPH-MCNC: 3.1 GM/DL
GLUCOSE BLD-MCNC: 88 MG/DL (ref 65–99)
HCT VFR BLD AUTO: 38.3 % (ref 34–46.6)
HGB BLD-MCNC: 12.5 G/DL (ref 12–15.9)
LYMPHOCYTES # BLD AUTO: 2.12 10*3/MM3 (ref 0.7–3.1)
LYMPHOCYTES NFR BLD AUTO: 32 % (ref 19.6–45.3)
MCH RBC QN AUTO: 29.5 PG (ref 26.6–33)
MCHC RBC AUTO-ENTMCNC: 32.6 G/DL (ref 31.5–35.7)
MCV RBC AUTO: 90.3 FL (ref 79–97)
MONOCYTES # BLD AUTO: 0.59 10*3/MM3 (ref 0.1–0.9)
MONOCYTES NFR BLD AUTO: 8.9 % (ref 5–12)
NEUTROPHILS # BLD AUTO: 3.5 10*3/MM3 (ref 1.7–7)
NEUTROPHILS NFR BLD AUTO: 52.9 % (ref 42.7–76)
PLATELET # BLD AUTO: 295 10*3/MM3 (ref 140–450)
PMV BLD AUTO: 9.1 FL (ref 6–12)
POTASSIUM BLD-SCNC: 4 MMOL/L (ref 3.5–5.2)
PROT SERPL-MCNC: 7.8 G/DL (ref 6–8.5)
RBC # BLD AUTO: 4.24 10*6/MM3 (ref 3.77–5.28)
SODIUM BLD-SCNC: 139 MMOL/L (ref 136–145)
WBC NRBC COR # BLD: 6.62 10*3/MM3 (ref 3.4–10.8)

## 2020-06-02 PROCEDURE — 99214 OFFICE O/P EST MOD 30 MIN: CPT | Performed by: INTERNAL MEDICINE

## 2020-06-02 PROCEDURE — 85025 COMPLETE CBC W/AUTO DIFF WBC: CPT | Performed by: INTERNAL MEDICINE

## 2020-06-02 PROCEDURE — 80053 COMPREHEN METABOLIC PANEL: CPT | Performed by: INTERNAL MEDICINE

## 2020-06-02 NOTE — PROGRESS NOTES
Patient didn't receive B12 injection today. Connie MALIN RN is going to call and reschedule patient.

## 2020-09-25 ENCOUNTER — OFFICE (OUTPATIENT)
Dept: URBAN - METROPOLITAN AREA CLINIC 64 | Facility: CLINIC | Age: 70
End: 2020-09-25
Payer: COMMERCIAL

## 2020-09-25 VITALS
DIASTOLIC BLOOD PRESSURE: 83 MMHG | SYSTOLIC BLOOD PRESSURE: 129 MMHG | HEIGHT: 64 IN | HEART RATE: 96 BPM | WEIGHT: 167 LBS

## 2020-09-25 DIAGNOSIS — R11.2 NAUSEA WITH VOMITING, UNSPECIFIED: ICD-10-CM

## 2020-09-25 DIAGNOSIS — D64.9 ANEMIA, UNSPECIFIED: ICD-10-CM

## 2020-09-25 DIAGNOSIS — R19.7 DIARRHEA, UNSPECIFIED: ICD-10-CM

## 2020-09-25 PROCEDURE — 99214 OFFICE O/P EST MOD 30 MIN: CPT | Performed by: NURSE PRACTITIONER

## 2020-09-25 RX ORDER — ONDANSETRON HYDROCHLORIDE 4 MG/1
16 TABLET, FILM COATED ORAL
Qty: 20 | Refills: 6 | Status: COMPLETED
Start: 2020-09-25 | End: 2022-01-11

## 2020-12-02 NOTE — PROGRESS NOTES
Hematology/Oncology Outpatient Follow Up    PATIENT NAME:Tessa Kilpatrick  :1950  MRN: [id number]  PRIMARY CARE PHYSICIAN: Sharona Pineda MD  REFERRING PHYSICIAN: Sharona Pineda MD    Chief Complaint   Patient presents with   • Follow-up     Malignant neoplasm of right female breast, unspecified estrogen receptor status, unspecified site of breast         HISTORY OF PRESENT ILLNESS:   This is a 70 y.o.female who in  was diagnosed with right breast cancer.  At the time patient was diagnosed with clinical stage III disease and underwent right modified mastectomy for invasive ductal carcinoma measuring 4.2 cm in size.  Seven out of nine lymph nodes were positive for metastatic disease.  Additional lymph node dissection revealed 6 out of 11 lymph nodes were positive for metastatic disease.  Her pathology stage was T2N3Mx.  Her right breast cancer was positive for estrogen receptor and negative for progesterone receptor, Ki-67 was positive at 36%.  HER-2/nickie by FISH was not amplified.  In 2009 patient received adjuvant chemotherapy with TAC which is combination of Adriamycin, Cytoxan and Taxotere for a total of eight cycles initiated in 3/26/09 and completed on 09.  This was subsequently followed by adjuvant radiation treatment and currently patient is on Femara since 2009.  She developed abnormal enhancement on her imaging studies and for that reason patient underwent prophylactic left mastectomy.  So far patient has tolerated Femara fairly well.  She was prescribed Arimidex briefly but due to intolerance this was discontinued.  Patient said she had her last bone density test approximately 18 months ago and this was within normal limits.  Patient has since them moved to this area and she wants to establish with medical oncology for continuity of care.    • 2009 - Patient underwent left simple mastectomy.  Pathology revealed benign breast tissue with fibrocystic  changes and fibroadenoma.    • 4/22/15 - WBC 6.9, hemoglobin 12.1, platelet count 231,000.  CMP showed normal liver function tests.  BUN 11.  Creatinine 0.6.   • 10/27/15 - Total cholesterol of 190,  (normal < 100), liver function tests (N).  • 11/3/15 - Anemia work up:  reticulocyte count (N) 0.91, B12 255, ferritin 78 (N), folate > 24, haptoglobin elevated to 207, LDH (N) 169, TIBC 404, serum iron (N) 55, iron saturation 14.  SPEP with immunofixation assay was negative for monoclonal gammopathy.    • 2/2/16 - Serum transferrin assay (N) 2.9, methylmalonic acid (N) 170.    • EGD and colonoscopy done revealed normal EGD and normal colonoscopy.  Follow up in 10 years was recommended for repeat colonoscopy.   • 3/24/16 - Comprehensive genetic analysis with [facility] which was essentially negative for any clinically significant mutation.    • 10/8/16 - Fasting lipid profile which showed a high cholesterol at 230, triglyceride (H) 226, LDL (H) 126.  Results were faxed to Dr. Pineda.  Chemistry panel:  BUN 13, creatinine 0.6.  Liver function tests (N).    • 12/12/17 - DEXA scan showed osteopenia.   • 5/14/18 - CBC:  WBC 4.9, hemoglobin 11.8, platelet count 249,000.  Differentials are 53% neutrophils, 29% lymphocytes.  There was no monocytosis, eosinophilia or basophilia.  B12 level was low-normal at 200.  Ferritin 38.  Folate normal 21.  BUN 17.  Creatinine 0.7.    • 5/16/18 - Urinalysis showed trace blood, otherwise negative.  Urine culture did not grow any significant organisms.    • 5/16/18 - Patient was placed on iron sulfate 325 mg p.o. daily and B12 injections.  She was also given a referral to GI.    • 6/13/18 - Patient seen by Dr. Donnelly.  Hemoccult stools were checked.   • Urinalysis at the last visit showed small blood.  Urine culture was negative for infection.  Patient was referred to Dr. Otilio Tong who she saw on 11/16/18.  Dr. Tong recommended CT scans of the abdomen and pelvis and also cystoscopy.     • 12/13/18 - Fasting lipid panel with total cholesterol of 197, triglyceride 124, HDL was 60, LDL was 112.  • 7/2019 Completed 10 years Femara treatment   • 12/27/2019- Patient had bone density which showed osteopenia.    Past Medical History:   Diagnosis Date   • Breast cancer (CMS/HCC)    • Fibromyalgia    • GERD (gastroesophageal reflux disease)    • Hypercholesteremia    • IBS (irritable bowel syndrome)    • Mitral valve prolapse    • Osteoarthritis    • Seasonal allergies        Past Surgical History:   Procedure Laterality Date   • APPENDECTOMY     • GALLBLADDER SURGERY  10/27/2020    Dr. Lima    • GANGLION CYST EXCISION      wrist   • HYSTERECTOMY     • MASTECTOMY           Current Outpatient Medications:   •  cyanocobalamin 1000 MCG/ML injection, INJECT 1 ML (CC) INTRAMUSCULARLY ONCE EVERY MONTH, Disp: 1 mL, Rfl: 3  •  diclofenac (VOLTAREN) 75 MG EC tablet, Take 75 mg by mouth 2 (Two) Times a Day., Disp: , Rfl: 4  •  dilTIAZem (TIAZAC) 180 MG 24 hr capsule, Take 240 mg by mouth Daily., Disp: , Rfl: 3  •  escitalopram (LEXAPRO) 10 MG tablet, Take 10 mg by mouth Daily., Disp: , Rfl: 4  •  montelukast (SINGULAIR) 10 MG tablet, Take 10 mg by mouth Daily., Disp: , Rfl: 3  •  omeprazole (priLOSEC) 40 MG capsule, TAKE 1 CAPSULE BY MOUTH ONCE DAILY FOR REFLUX, Disp: , Rfl: 12  •  pravastatin (PRAVACHOL) 80 MG tablet, Take 80 mg by mouth Daily., Disp: , Rfl: 4    Allergies   Allergen Reactions   • Atenolol Unknown (See Comments)     Not known    • Codeine Unknown (See Comments)     Compounded drug    • Fiorinal [Butalbital-Aspirin-Caffeine] Unknown (See Comments)     Not known    • Morphine Unknown (See Comments)     Unknown    • Valium [Diazepam] Unknown (See Comments)     Not known        Family History   Problem Relation Age of Onset   • Breast cancer Paternal Aunt 75       Cancer-related family history includes Breast cancer (age of onset: 75) in her paternal aunt.    Social History     Tobacco Use   •  "Smoking status: Former Smoker     Types: Cigarettes   • Tobacco comment: 40 years ago    Substance Use Topics   • Alcohol use: No     Frequency: Never     Drinks per session: Patient refused     Binge frequency: Patient refused   • Drug use: No       HPI, ROS and PFSH have been reviewed and confirmed on 12/3/2020.     SUBJECTIVE:  The patient presented for a scheduled follow up appointment unaccompanied. She had gallbladder surgery 10/27/2020.         REVIEW OF SYSTEMS:  Review of Systems   Constitutional: Positive for fatigue. Negative for chills and fever.   HENT: Negative for ear pain, mouth sores, nosebleeds and sore throat.    Eyes: Negative for photophobia and visual disturbance.        Wears glasses    Respiratory: Negative for wheezing and stridor.         Snoring at night    Cardiovascular: Negative for chest pain and palpitations.   Gastrointestinal: Negative for abdominal pain, diarrhea, nausea and vomiting.   Endocrine: Negative for cold intolerance and heat intolerance.   Genitourinary: Negative for dysuria and hematuria.   Musculoskeletal: Negative for joint swelling and neck stiffness.   Skin: Negative for color change and rash.   Neurological: Negative for dizziness, seizures, syncope and light-headedness.   Hematological: Negative for adenopathy.        No obvious bleeding   Psychiatric/Behavioral: Negative for agitation, confusion and hallucinations.   All other systems reviewed and are negative.    OBJECTIVE:  Vitals:    12/03/20 0939   BP: 133/71   Pulse: 101   Resp: 18   Temp: 96.4 °F (35.8 °C)   Weight: 76.3 kg (168 lb 3.2 oz)   Height: 162.6 cm (64\")   PainSc: 0-No pain       ECOG  (0) Fully active, able to carry on all predisease performance without restriction    Physical Exam   Constitutional: She is oriented to person, place, and time. No distress.   HENT:   Head: Normocephalic and atraumatic.   Eyes: Conjunctivae are normal. Right eye exhibits no discharge. Left eye exhibits no discharge. " No scleral icterus.   Neck: Normal range of motion. Neck supple. No thyromegaly present.   Cardiovascular: Normal rate, regular rhythm and normal heart sounds. Exam reveals no gallop and no friction rub.   Pulmonary/Chest: Effort normal. No stridor. No respiratory distress. She has no wheezes. She exhibits no mass, no tenderness, no edema and no swelling.   Bilateral mastectomy - chest wall exam was negative  Bilateral axillary exam was negative   Abdominal: Soft. Bowel sounds are normal. She exhibits no mass. There is no abdominal tenderness. There is no rebound and no guarding.   Musculoskeletal: Normal range of motion. No tenderness.   Lymphadenopathy:     She has no cervical adenopathy.   Neurological: She is alert and oriented to person, place, and time. She exhibits normal muscle tone.   Skin: Skin is warm. No rash noted. She is not diaphoretic. No erythema.   Psychiatric: Her behavior is normal.   Nursing note and vitals reviewed.    RECENT LABS  WBC   Date Value Ref Range Status   12/03/2020 6.71 3.40 - 10.80 10*3/mm3 Final     RBC   Date Value Ref Range Status   12/03/2020 4.26 3.77 - 5.28 10*6/mm3 Final     Hemoglobin   Date Value Ref Range Status   12/03/2020 12.3 12.0 - 15.9 g/dL Final     Hematocrit   Date Value Ref Range Status   12/03/2020 38.8 34.0 - 46.6 % Final     MCV   Date Value Ref Range Status   12/03/2020 91.1 79.0 - 97.0 fL Final     MCH   Date Value Ref Range Status   12/03/2020 28.9 26.6 - 33.0 pg Final     MCHC   Date Value Ref Range Status   12/03/2020 31.7 31.5 - 35.7 g/dL Final     RDW   Date Value Ref Range Status   12/03/2020 12.9 12.3 - 15.4 % Final     RDW-SD   Date Value Ref Range Status   12/03/2020 42.5 37.0 - 54.0 fl Final     MPV   Date Value Ref Range Status   12/03/2020 9.4 6.0 - 12.0 fL Final     Platelets   Date Value Ref Range Status   12/03/2020 297 140 - 450 10*3/mm3 Final     Neutrophil %   Date Value Ref Range Status   12/03/2020 54.0 42.7 - 76.0 % Final      Lymphocyte %   Date Value Ref Range Status   12/03/2020 27.4 19.6 - 45.3 % Final     Monocyte %   Date Value Ref Range Status   12/03/2020 9.4 5.0 - 12.0 % Final     Eosinophil %   Date Value Ref Range Status   12/03/2020 8.0 (H) 0.3 - 6.2 % Final     Basophil %   Date Value Ref Range Status   12/03/2020 1.2 0.0 - 1.5 % Final     Neutrophils, Absolute   Date Value Ref Range Status   12/03/2020 3.62 1.70 - 7.00 10*3/mm3 Final     Lymphocytes, Absolute   Date Value Ref Range Status   12/03/2020 1.84 0.70 - 3.10 10*3/mm3 Final     Monocytes, Absolute   Date Value Ref Range Status   12/03/2020 0.63 0.10 - 0.90 10*3/mm3 Final     Eosinophils, Absolute   Date Value Ref Range Status   12/03/2020 0.54 (H) 0.00 - 0.40 10*3/mm3 Final     Basophils, Absolute   Date Value Ref Range Status   12/03/2020 0.08 0.00 - 0.20 10*3/mm3 Final       Lab Results   Component Value Date    GLUCOSE 88 06/02/2020    BUN 12 06/02/2020    CREATININE 0.66 06/02/2020    EGFRIFNONA 89 06/02/2020    BCR 18.2 06/02/2020    K 4.0 06/02/2020    CO2 28.0 06/02/2020    CALCIUM 9.4 06/02/2020    ALBUMIN 4.70 06/02/2020    LABIL2 1.3 03/13/2019    AST 31 06/02/2020    ALT 31 06/02/2020         Assessment/Plan     B12 deficiency  - Vitamin B12    Malignant neoplasm of right female breast, unspecified estrogen receptor status, unspecified site of breast (CMS/[facility])  - CBC & Differential  - Comprehensive Metabolic Panel    Osteopenia of neck of femur, unspecified laterality    ASSESSMENT:  1. Clinical stage III invasive ductal carcinoma involving the right breast status post right modified mastectomy followed by adjuvant chemotherapy with TAC for eight cycles, followed by adjuvant right chest wall and markie radiation and currently on Femara since November 2009.  So far patient does not have any clinical evidence of relapse disease.  Patient has d/c Femara in 7/2019.   2. Status post left prophylactic mastectomy with benign pathology.     3. Anemia secondary  to iron deficiency and B12 deficiency, on B12 injection.  Off iron supplementation   4. Comprehensive genetic analysis with [facility] was essentially negative for any clinically significant mutation.    5. Hypertriglyceridemia.  Primary care physician  6. Osteopenia, off Prolia now due to discontinuation of AI.   Continued on calcium + vitamin D.   7. Hematuria.  Work up by Dr. Otilio Tong with [facility].     PLANS:  1. CBC, CMP, and vitamin B12   2. Continue OsCal  3. Patient is off B12 injections at this time   4. Consider Evista if patient develops osteoporosis   5. DEXA scan due 12/2021   6. Continue breast self examination   7. Advised to continue social isolation/distancing due to the ongoing coronavirus pandemic.    8. Patient had flu vaccine 10/2020  9. RTC in 6 months to see Elisa Jacob MD       I have reviewed labs results, imaging, vitals, and medications with the patient today.  CBC is normal today and was reviewed with the patient.    Lab Results   Component Value Date    WBC 6.71 12/03/2020    HGB 12.3 12/03/2020    HCT 38.8 12/03/2020    MCV 91.1 12/03/2020     12/03/2020     Patient verbalized understanding and is in agreement of the above plan.    Electronically signed by VICENTE Walker, 12/03/20, 10:03 AM EST.  .

## 2020-12-03 ENCOUNTER — LAB (OUTPATIENT)
Dept: LAB | Facility: HOSPITAL | Age: 70
End: 2020-12-03

## 2020-12-03 ENCOUNTER — OFFICE VISIT (OUTPATIENT)
Dept: ONCOLOGY | Facility: CLINIC | Age: 70
End: 2020-12-03

## 2020-12-03 VITALS
HEART RATE: 101 BPM | TEMPERATURE: 96.4 F | DIASTOLIC BLOOD PRESSURE: 71 MMHG | SYSTOLIC BLOOD PRESSURE: 133 MMHG | HEIGHT: 64 IN | BODY MASS INDEX: 28.71 KG/M2 | RESPIRATION RATE: 18 BRPM | WEIGHT: 168.2 LBS

## 2020-12-03 DIAGNOSIS — C50.911 MALIGNANT NEOPLASM OF RIGHT FEMALE BREAST, UNSPECIFIED ESTROGEN RECEPTOR STATUS, UNSPECIFIED SITE OF BREAST (HCC): ICD-10-CM

## 2020-12-03 DIAGNOSIS — M85.859 OSTEOPENIA OF NECK OF FEMUR, UNSPECIFIED LATERALITY: ICD-10-CM

## 2020-12-03 DIAGNOSIS — E53.8 B12 DEFICIENCY: Primary | ICD-10-CM

## 2020-12-03 DIAGNOSIS — E53.8 B12 DEFICIENCY: ICD-10-CM

## 2020-12-03 LAB
ALBUMIN SERPL-MCNC: 4.5 G/DL (ref 3.5–5.2)
ALBUMIN/GLOB SERPL: 1.9 G/DL
ALP SERPL-CCNC: 70 U/L (ref 39–117)
ALT SERPL W P-5'-P-CCNC: 40 U/L (ref 1–33)
ANION GAP SERPL CALCULATED.3IONS-SCNC: 9 MMOL/L (ref 5–15)
AST SERPL-CCNC: 43 U/L (ref 1–32)
BASOPHILS # BLD AUTO: 0.08 10*3/MM3 (ref 0–0.2)
BASOPHILS NFR BLD AUTO: 1.2 % (ref 0–1.5)
BILIRUB SERPL-MCNC: 0.3 MG/DL (ref 0–1.2)
BUN SERPL-MCNC: 16 MG/DL (ref 8–23)
BUN/CREAT SERPL: 23.2 (ref 7–25)
CALCIUM SPEC-SCNC: 9.3 MG/DL (ref 8.6–10.5)
CHLORIDE SERPL-SCNC: 103 MMOL/L (ref 98–107)
CO2 SERPL-SCNC: 29 MMOL/L (ref 22–29)
CREAT SERPL-MCNC: 0.69 MG/DL (ref 0.57–1)
DEPRECATED RDW RBC AUTO: 42.5 FL (ref 37–54)
EOSINOPHIL # BLD AUTO: 0.54 10*3/MM3 (ref 0–0.4)
EOSINOPHIL NFR BLD AUTO: 8 % (ref 0.3–6.2)
ERYTHROCYTE [DISTWIDTH] IN BLOOD BY AUTOMATED COUNT: 12.9 % (ref 12.3–15.4)
GFR SERPL CREATININE-BSD FRML MDRD: 85 ML/MIN/1.73
GLOBULIN UR ELPH-MCNC: 2.4 GM/DL
GLUCOSE SERPL-MCNC: 104 MG/DL (ref 65–99)
HCT VFR BLD AUTO: 38.8 % (ref 34–46.6)
HGB BLD-MCNC: 12.3 G/DL (ref 12–15.9)
LYMPHOCYTES # BLD AUTO: 1.84 10*3/MM3 (ref 0.7–3.1)
LYMPHOCYTES NFR BLD AUTO: 27.4 % (ref 19.6–45.3)
MCH RBC QN AUTO: 28.9 PG (ref 26.6–33)
MCHC RBC AUTO-ENTMCNC: 31.7 G/DL (ref 31.5–35.7)
MCV RBC AUTO: 91.1 FL (ref 79–97)
MONOCYTES # BLD AUTO: 0.63 10*3/MM3 (ref 0.1–0.9)
MONOCYTES NFR BLD AUTO: 9.4 % (ref 5–12)
NEUTROPHILS NFR BLD AUTO: 3.62 10*3/MM3 (ref 1.7–7)
NEUTROPHILS NFR BLD AUTO: 54 % (ref 42.7–76)
PLATELET # BLD AUTO: 297 10*3/MM3 (ref 140–450)
PMV BLD AUTO: 9.4 FL (ref 6–12)
POTASSIUM SERPL-SCNC: 3.5 MMOL/L (ref 3.5–5.2)
PROT SERPL-MCNC: 6.9 G/DL (ref 6–8.5)
RBC # BLD AUTO: 4.26 10*6/MM3 (ref 3.77–5.28)
SODIUM SERPL-SCNC: 141 MMOL/L (ref 136–145)
VIT B12 BLD-MCNC: 407 PG/ML (ref 211–946)
WBC # BLD AUTO: 6.71 10*3/MM3 (ref 3.4–10.8)

## 2020-12-03 PROCEDURE — 85025 COMPLETE CBC W/AUTO DIFF WBC: CPT

## 2020-12-03 PROCEDURE — 80053 COMPREHEN METABOLIC PANEL: CPT

## 2020-12-03 PROCEDURE — 99214 OFFICE O/P EST MOD 30 MIN: CPT | Performed by: NURSE PRACTITIONER

## 2020-12-03 PROCEDURE — 82607 VITAMIN B-12: CPT

## 2020-12-03 PROCEDURE — 36415 COLL VENOUS BLD VENIPUNCTURE: CPT

## 2021-06-01 NOTE — PROGRESS NOTES
Hematology/Oncology Outpatient Follow Up    PATIENT NAME:Tessa Kilpatrick  :1950  MRN: [id number]  PRIMARY CARE PHYSICIAN: Sharona Pineda MD  REFERRING PHYSICIAN: Sharona Pineda MD    Chief Complaint   Patient presents with   • Follow-up     Malignant neoplasm of right female breast, unspecified estrogen receptor status, unspecified site of breast (CMS/HCC)        HISTORY OF PRESENT ILLNESS:     This is a 70 y.o.female who in  was diagnosed with right breast cancer.  At the time patient was diagnosed with clinical stage III disease and underwent right modified mastectomy for invasive ductal carcinoma measuring 4.2 cm in size.  Seven out of nine lymph nodes were positive for metastatic disease.  Additional lymph node dissection revealed 6 out of 11 lymph nodes were positive for metastatic disease.  Her pathology stage was T2N3Mx.  Her right breast cancer was positive for estrogen receptor and negative for progesterone receptor, Ki-67 was positive at 36%.  HER-2/nickie by FISH was not amplified.  In 2009 patient received adjuvant chemotherapy with TAC which is combination of Adriamycin, Cytoxan and Taxotere for a total of eight cycles initiated in 3/26/09 and completed on 09.  This was subsequently followed by adjuvant radiation treatment and currently patient is on Femara since 2009.  She developed abnormal enhancement on her imaging studies and for that reason patient underwent prophylactic left mastectomy.  So far patient has tolerated Femara fairly well.  She was prescribed Arimidex briefly but due to intolerance this was discontinued.  Patient said she had her last bone density test approximately 18 months ago and this was within normal limits.  Patient has since them moved to this area and she wants to establish with medical oncology for continuity of care.    • 2009 - Patient underwent left simple mastectomy.  Pathology revealed benign breast tissue with  fibrocystic changes and fibroadenoma.    • 4/22/15 - WBC 6.9, hemoglobin 12.1, platelet count 231,000.  CMP showed normal liver function tests.  BUN 11.  Creatinine 0.6.   • 10/27/15 - Total cholesterol of 190,  (normal < 100), liver function tests (N).  • 11/3/15 - Anemia work up:  reticulocyte count (N) 0.91, B12 255, ferritin 78 (N), folate > 24, haptoglobin elevated to 207, LDH (N) 169, TIBC 404, serum iron (N) 55, iron saturation 14.  SPEP with immunofixation assay was negative for monoclonal gammopathy.    • 2/2/16 - Serum transferrin assay (N) 2.9, methylmalonic acid (N) 170.    • EGD and colonoscopy done revealed normal EGD and normal colonoscopy.  Follow up in 10 years was recommended for repeat colonoscopy.   • 3/24/16 - Comprehensive genetic analysis with [employer] which was essentially negative for any clinically significant mutation.    • 10/8/16 - Fasting lipid profile which showed a high cholesterol at 230, triglyceride (H) 226, LDL (H) 126.  Results were faxed to Dr. Pineda.  Chemistry panel:  BUN 13, creatinine 0.6.  Liver function tests (N).    • 12/12/17 - DEXA scan showed osteopenia.   • 5/14/18 - CBC:  WBC 4.9, hemoglobin 11.8, platelet count 249,000.  Differentials are 53% neutrophils, 29% lymphocytes.  There was no monocytosis, eosinophilia or basophilia.  B12 level was low-normal at 200.  Ferritin 38.  Folate normal 21.  BUN 17.  Creatinine 0.7.    • 5/16/18 - Urinalysis showed trace blood, otherwise negative.  Urine culture did not grow any significant organisms.    • 5/16/18 - Patient was placed on iron sulfate 325 mg p.o. daily and B12 injections.  She was also given a referral to GI.    • 6/13/18 - Patient seen by Dr. Donnelly.  Hemoccult stools were checked.   • Urinalysis at the last visit showed small blood.  Urine culture was negative for infection.  Patient was referred to Dr. Otilio Tong who she saw on 11/16/18.  Dr. Tong recommended CT scans of the abdomen and pelvis and also  cystoscopy.    • 12/13/18 - Fasting lipid panel with total cholesterol of 197, triglyceride 124, HDL was 60, LDL was 112.  • 7/2019 Completed 10 years Femara treatment   • 12/27/2019- Patient had bone density which showed osteopenia.    Past Medical History:   Diagnosis Date   • Breast cancer (CMS/HCC)    • Fibromyalgia    • GERD (gastroesophageal reflux disease)    • Hypercholesteremia    • IBS (irritable bowel syndrome)    • Mitral valve prolapse    • Osteoarthritis    • Seasonal allergies        Past Surgical History:   Procedure Laterality Date   • APPENDECTOMY     • CATARACT EXTRACTION, BILATERAL     • GALLBLADDER SURGERY  10/27/2020    Dr. Lima    • GANGLION CYST EXCISION      wrist   • HYSTERECTOMY     • MASTECTOMY           Current Outpatient Medications:   •  cyanocobalamin 1000 MCG/ML injection, INJECT 1 ML (CC) INTRAMUSCULARLY ONCE EVERY MONTH, Disp: 1 mL, Rfl: 3  •  diclofenac (VOLTAREN) 75 MG EC tablet, Take 75 mg by mouth 2 (Two) Times a Day., Disp: , Rfl: 4  •  dicyclomine (BENTYL) 20 MG tablet, , Disp: , Rfl:   •  dilTIAZem (TIAZAC) 240 MG 24 hr capsule, 240 mg Daily., Disp: , Rfl:   •  escitalopram (LEXAPRO) 10 MG tablet, Take 10 mg by mouth Daily., Disp: , Rfl: 4  •  montelukast (SINGULAIR) 10 MG tablet, Take 10 mg by mouth Daily., Disp: , Rfl: 3  •  omeprazole (priLOSEC) 40 MG capsule, TAKE 1 CAPSULE BY MOUTH ONCE DAILY FOR REFLUX, Disp: , Rfl: 12  •  pravastatin (PRAVACHOL) 80 MG tablet, Take 80 mg by mouth Daily., Disp: , Rfl: 4    Allergies   Allergen Reactions   • Atenolol Unknown (See Comments)     Not known    • Codeine Unknown (See Comments)     Compounded drug    • Fiorinal [Butalbital-Aspirin-Caffeine] Unknown (See Comments)     Not known    • Morphine Unknown (See Comments)     Unknown    • Valium [Diazepam] Unknown (See Comments)     Not known        Family History   Problem Relation Age of Onset   • Breast cancer Paternal Aunt 75       Cancer-related family history includes Breast  cancer (age of onset: 75) in her paternal aunt.    Social History     Tobacco Use   • Smoking status: Former Smoker     Types: Cigarettes   • Tobacco comment: 40 years ago    Substance Use Topics   • Alcohol use: No   • Drug use: No       HPI, ROS and PFSH have been reviewed and confirmed on 6/3/2021.     SUBJECTIVE:    The patient presented for a scheduled follow up appointment unaccompanied. She had gallbladder surgery 10/27/2020.    She has no chest wall issues        REVIEW OF SYSTEMS:  Review of Systems   Constitutional: Positive for fatigue. Negative for chills and fever.   HENT: Negative for ear pain, mouth sores, nosebleeds and sore throat.    Eyes: Negative for photophobia and visual disturbance.        Wears glasses    Respiratory: Negative for wheezing and stridor.         Snoring at night    Cardiovascular: Negative for chest pain and palpitations.   Gastrointestinal: Negative for abdominal pain, diarrhea, nausea and vomiting.   Endocrine: Negative for cold intolerance and heat intolerance.   Genitourinary: Negative for dysuria and hematuria.   Musculoskeletal: Negative for joint swelling and neck stiffness.   Skin: Negative for color change and rash.   Neurological: Negative for dizziness, seizures, syncope and light-headedness.   Hematological: Negative for adenopathy.        No obvious bleeding   Psychiatric/Behavioral: Negative for agitation, confusion and hallucinations.   All other systems reviewed and are negative.    OBJECTIVE:  Vitals:    06/03/21 0928   BP: 140/91   Pulse: 108   Temp: 97.7 °F (36.5 °C)   Weight: 74.6 kg (164 lb 6.4 oz)   PainSc: 0-No pain       ECOG  (0) Fully active, able to carry on all predisease performance without restriction    Physical Exam   Constitutional: She is oriented to person, place, and time. No distress.   HENT:   Head: Normocephalic and atraumatic.   Eyes: Conjunctivae are normal. Right eye exhibits no discharge. Left eye exhibits no discharge. No scleral  icterus.   Neck: No thyromegaly present.   Cardiovascular: Normal rate, regular rhythm and normal heart sounds. Exam reveals no gallop and no friction rub.   Pulmonary/Chest: Effort normal. No stridor. No respiratory distress. She has no wheezes. She exhibits no mass, no tenderness, no edema and no swelling.   Bilateral mastectomy - chest wall exam was negative  Bilateral axillary exam was negative   Abdominal: Soft. Bowel sounds are normal. She exhibits no mass. There is no abdominal tenderness. There is no rebound and no guarding.   Musculoskeletal: Normal range of motion. No tenderness.   Lymphadenopathy:     She has no cervical adenopathy.   Neurological: She is alert and oriented to person, place, and time. She exhibits normal muscle tone.   Skin: Skin is warm. No rash noted. She is not diaphoretic. No erythema.   Psychiatric: Her behavior is normal.   Nursing note and vitals reviewed.  I have reexamined the patient and the results are consistent with the previously documented exam. Elisacatalina Jacob MD   RECENT LABS  WBC   Date Value Ref Range Status   06/03/2021 6.63 3.40 - 10.80 10*3/mm3 Final     RBC   Date Value Ref Range Status   06/03/2021 4.45 3.77 - 5.28 10*6/mm3 Final     Hemoglobin   Date Value Ref Range Status   06/03/2021 12.9 12.0 - 15.9 g/dL Final     Hematocrit   Date Value Ref Range Status   06/03/2021 40.5 34.0 - 46.6 % Final     MCV   Date Value Ref Range Status   06/03/2021 91.0 79.0 - 97.0 fL Final     MCH   Date Value Ref Range Status   06/03/2021 29.0 26.6 - 33.0 pg Final     MCHC   Date Value Ref Range Status   06/03/2021 31.9 31.5 - 35.7 g/dL Final     RDW   Date Value Ref Range Status   06/03/2021 12.4 12.3 - 15.4 % Final     RDW-SD   Date Value Ref Range Status   06/03/2021 40.8 37.0 - 54.0 fl Final     MPV   Date Value Ref Range Status   06/03/2021 9.2 6.0 - 12.0 fL Final     Platelets   Date Value Ref Range Status   06/03/2021 293 140 - 450 10*3/mm3 Final     Neutrophil %    Date Value Ref Range Status   06/03/2021 61.7 42.7 - 76.0 % Final     Lymphocyte %   Date Value Ref Range Status   06/03/2021 25.9 19.6 - 45.3 % Final     Monocyte %   Date Value Ref Range Status   06/03/2021 8.0 5.0 - 12.0 % Final     Eosinophil %   Date Value Ref Range Status   06/03/2021 3.3 0.3 - 6.2 % Final     Basophil %   Date Value Ref Range Status   06/03/2021 1.1 0.0 - 1.5 % Final     Neutrophils, Absolute   Date Value Ref Range Status   06/03/2021 4.09 1.70 - 7.00 10*3/mm3 Final     Lymphocytes, Absolute   Date Value Ref Range Status   06/03/2021 1.72 0.70 - 3.10 10*3/mm3 Final     Monocytes, Absolute   Date Value Ref Range Status   06/03/2021 0.53 0.10 - 0.90 10*3/mm3 Final     Eosinophils, Absolute   Date Value Ref Range Status   06/03/2021 0.22 0.00 - 0.40 10*3/mm3 Final     Basophils, Absolute   Date Value Ref Range Status   06/03/2021 0.07 0.00 - 0.20 10*3/mm3 Final       Lab Results   Component Value Date    GLUCOSE 105 (H) 06/03/2021    BUN 11 06/03/2021    CREATININE 0.73 06/03/2021    EGFRIFNONA 79 06/03/2021    BCR 15.1 06/03/2021    K 3.9 06/03/2021    CO2 25.0 06/03/2021    CALCIUM 9.6 06/03/2021    ALBUMIN 4.60 06/03/2021    LABIL2 1.3 03/13/2019    AST 39 (H) 06/03/2021    ALT 47 (H) 06/03/2021         Assessment/Plan     Malignant neoplasm of right female breast, unspecified estrogen receptor status, unspecified site of breast (CMS/HCC)  - CBC & Differential  - Comprehensive Metabolic Panel  - DEXA Bone Density Axial  - Comprehensive Metabolic Panel    Disorder of bone density and structure, unspecified  - CBC & Differential  - DEXA Bone Density Axial    Osteopenia of neck of femur, unspecified laterality  - DEXA Bone Density Axial    Localized osteoporosis ([place])  - DEXA Bone Density Axial    ASSESSMENT:  1. Clinical stage III invasive ductal carcinoma involving the right breast status post right modified mastectomy followed by adjuvant chemotherapy with TAC for eight cycles,  followed by adjuvant right chest wall and markie radiation and currently on Femara since November 2009.  So far patient does not have any clinical evidence of relapse disease.  Patient has d/c Femara in 7/2019.  Continue surveillance  2. Status post left prophylactic mastectomy with benign pathology.   Reviewed  3. Anemia secondary to iron deficiency and B12 deficiency, on B12 injection.  Off iron supplementation   4. Comprehensive genetic analysis with [employer] was essentially negative for any clinically significant mutation.    5. Hypertriglyceridemia.  Primary care physician  6. Osteopenia, off Prolia now due to discontinuation of AI.   Continued on calcium + vitamin D.   7. Hematuria.  Work up by Dr. Otilio Tong with [facility].     PLANS:  1. Reviewed her CBC  2. Ordered CMP today  3. Schedule bone density which is due December 2021  4. She will continue Os-Phillip D for osteopenia.  Patient is now off Prolia  5. Patient is off B12 injections at this time   6. Consider Evista if patient develops osteoporosis   7. DEXA scan due 12/2021: We will order today  8. Continue breast self examination   9. RTC in 6 months        I have reviewed labs results, imaging, vitals, and medications with the patient today.  CBC is normal today and was reviewed with the patient.    Lab Results   Component Value Date    WBC 6.63 06/03/2021    HGB 12.9 06/03/2021    HCT 40.5 06/03/2021    MCV 91.0 06/03/2021     06/03/2021     Patient verbalized understanding and is in agreement of the above plan.      .

## 2021-06-03 ENCOUNTER — OFFICE VISIT (OUTPATIENT)
Dept: ONCOLOGY | Facility: CLINIC | Age: 71
End: 2021-06-03

## 2021-06-03 ENCOUNTER — LAB (OUTPATIENT)
Dept: LAB | Facility: HOSPITAL | Age: 71
End: 2021-06-03

## 2021-06-03 VITALS
HEART RATE: 108 BPM | DIASTOLIC BLOOD PRESSURE: 91 MMHG | WEIGHT: 164.4 LBS | TEMPERATURE: 97.7 F | BODY MASS INDEX: 28.22 KG/M2 | SYSTOLIC BLOOD PRESSURE: 140 MMHG

## 2021-06-03 DIAGNOSIS — C50.911 MALIGNANT NEOPLASM OF RIGHT FEMALE BREAST, UNSPECIFIED ESTROGEN RECEPTOR STATUS, UNSPECIFIED SITE OF BREAST (HCC): Primary | ICD-10-CM

## 2021-06-03 DIAGNOSIS — M85.859 OSTEOPENIA OF NECK OF FEMUR, UNSPECIFIED LATERALITY: ICD-10-CM

## 2021-06-03 DIAGNOSIS — C50.911 MALIGNANT NEOPLASM OF RIGHT FEMALE BREAST, UNSPECIFIED ESTROGEN RECEPTOR STATUS, UNSPECIFIED SITE OF BREAST (HCC): ICD-10-CM

## 2021-06-03 DIAGNOSIS — M85.9 DISORDER OF BONE DENSITY AND STRUCTURE, UNSPECIFIED: ICD-10-CM

## 2021-06-03 DIAGNOSIS — R79.89 ELEVATED LIVER FUNCTION TESTS: ICD-10-CM

## 2021-06-03 DIAGNOSIS — M81.6 LOCALIZED OSTEOPOROSIS (LEQUESNE): ICD-10-CM

## 2021-06-03 LAB
ALBUMIN SERPL-MCNC: 4.6 G/DL (ref 3.5–5.2)
ALBUMIN/GLOB SERPL: 1.6 G/DL
ALP SERPL-CCNC: 74 U/L (ref 39–117)
ALT SERPL W P-5'-P-CCNC: 47 U/L (ref 1–33)
ANION GAP SERPL CALCULATED.3IONS-SCNC: 14 MMOL/L (ref 5–15)
AST SERPL-CCNC: 39 U/L (ref 1–32)
BASOPHILS # BLD AUTO: 0.07 10*3/MM3 (ref 0–0.2)
BASOPHILS NFR BLD AUTO: 1.1 % (ref 0–1.5)
BILIRUB SERPL-MCNC: 0.4 MG/DL (ref 0–1.2)
BUN SERPL-MCNC: 11 MG/DL (ref 8–23)
BUN/CREAT SERPL: 15.1 (ref 7–25)
CALCIUM SPEC-SCNC: 9.6 MG/DL (ref 8.6–10.5)
CHLORIDE SERPL-SCNC: 102 MMOL/L (ref 98–107)
CO2 SERPL-SCNC: 25 MMOL/L (ref 22–29)
CREAT SERPL-MCNC: 0.73 MG/DL (ref 0.57–1)
DEPRECATED RDW RBC AUTO: 40.8 FL (ref 37–54)
EOSINOPHIL # BLD AUTO: 0.22 10*3/MM3 (ref 0–0.4)
EOSINOPHIL NFR BLD AUTO: 3.3 % (ref 0.3–6.2)
ERYTHROCYTE [DISTWIDTH] IN BLOOD BY AUTOMATED COUNT: 12.4 % (ref 12.3–15.4)
GFR SERPL CREATININE-BSD FRML MDRD: 79 ML/MIN/1.73
GLOBULIN UR ELPH-MCNC: 2.9 GM/DL
GLUCOSE SERPL-MCNC: 105 MG/DL (ref 65–99)
HCT VFR BLD AUTO: 40.5 % (ref 34–46.6)
HGB BLD-MCNC: 12.9 G/DL (ref 12–15.9)
LYMPHOCYTES # BLD AUTO: 1.72 10*3/MM3 (ref 0.7–3.1)
LYMPHOCYTES NFR BLD AUTO: 25.9 % (ref 19.6–45.3)
MCH RBC QN AUTO: 29 PG (ref 26.6–33)
MCHC RBC AUTO-ENTMCNC: 31.9 G/DL (ref 31.5–35.7)
MCV RBC AUTO: 91 FL (ref 79–97)
MONOCYTES # BLD AUTO: 0.53 10*3/MM3 (ref 0.1–0.9)
MONOCYTES NFR BLD AUTO: 8 % (ref 5–12)
NEUTROPHILS NFR BLD AUTO: 4.09 10*3/MM3 (ref 1.7–7)
NEUTROPHILS NFR BLD AUTO: 61.7 % (ref 42.7–76)
PLATELET # BLD AUTO: 293 10*3/MM3 (ref 140–450)
PMV BLD AUTO: 9.2 FL (ref 6–12)
POTASSIUM SERPL-SCNC: 3.9 MMOL/L (ref 3.5–5.2)
PROT SERPL-MCNC: 7.5 G/DL (ref 6–8.5)
RBC # BLD AUTO: 4.45 10*6/MM3 (ref 3.77–5.28)
SODIUM SERPL-SCNC: 141 MMOL/L (ref 136–145)
WBC # BLD AUTO: 6.63 10*3/MM3 (ref 3.4–10.8)

## 2021-06-03 PROCEDURE — 36415 COLL VENOUS BLD VENIPUNCTURE: CPT | Performed by: INTERNAL MEDICINE

## 2021-06-03 PROCEDURE — 99214 OFFICE O/P EST MOD 30 MIN: CPT | Performed by: INTERNAL MEDICINE

## 2021-06-03 PROCEDURE — 85025 COMPLETE CBC W/AUTO DIFF WBC: CPT

## 2021-06-03 PROCEDURE — 80053 COMPREHEN METABOLIC PANEL: CPT | Performed by: INTERNAL MEDICINE

## 2021-06-03 RX ORDER — DILTIAZEM HYDROCHLORIDE 240 MG/1
240 CAPSULE, EXTENDED RELEASE ORAL DAILY
COMMUNITY
Start: 2021-06-01

## 2021-06-03 RX ORDER — DICYCLOMINE HCL 20 MG
TABLET ORAL
COMMUNITY
Start: 2021-05-31

## 2021-06-04 ENCOUNTER — TELEPHONE (OUTPATIENT)
Dept: ONCOLOGY | Facility: CLINIC | Age: 71
End: 2021-06-04

## 2021-06-04 DIAGNOSIS — R79.89 ELEVATED LIVER FUNCTION TESTS: Primary | ICD-10-CM

## 2021-06-04 NOTE — TELEPHONE ENCOUNTER
Called pt to let her know that her liver function tests were elevated and that she needs to follow-up with her PCP. I told her that I added a hepatitis panel to the blood that was drawn yesterday, but if it is unable to be added I will call her back. I also told her that Dr. Jacob has ordered a CT of the abdomen and pelvis and that she will get a call to schedule that. Pt verbalized understanding. Denied any questions at this time.

## 2021-06-04 NOTE — TELEPHONE ENCOUNTER
----- Message from Elisa Jacob MD sent at 6/3/2021  7:12 PM EDT -----  Patient has mildly elevated liver function tests.  She needs to follow-up with her primary care physician.  May be medication induced.  Order viral hepatitis panel and CT scan of the abdomen and pelvis to look at the liver.

## 2021-06-22 ENCOUNTER — HOSPITAL ENCOUNTER (OUTPATIENT)
Dept: PET IMAGING | Facility: HOSPITAL | Age: 71
Discharge: HOME | End: 2021-06-22
Admitting: INTERNAL MEDICINE

## 2021-06-22 DIAGNOSIS — R79.89 ELEVATED LIVER FUNCTION TESTS: ICD-10-CM

## 2021-06-22 PROCEDURE — 74177 CT ABD & PELVIS W/CONTRAST: CPT

## 2021-06-22 PROCEDURE — 0 IOPAMIDOL PER 1 ML: Performed by: INTERNAL MEDICINE

## 2021-06-22 RX ADMIN — IOPAMIDOL 100 ML: 755 INJECTION, SOLUTION INTRAVENOUS at 09:17

## 2021-06-25 ENCOUNTER — TELEPHONE (OUTPATIENT)
Dept: ONCOLOGY | Facility: CLINIC | Age: 71
End: 2021-06-25

## 2021-06-25 ENCOUNTER — TELEPHONE (OUTPATIENT)
Dept: ONCOLOGY | Facility: HOSPITAL | Age: 71
End: 2021-06-25

## 2021-06-25 NOTE — TELEPHONE ENCOUNTER
Pt called regarding results of her recent CT scan. I read her the radiologists impression and told her the Dr will go over it in detail when she is in for her next visit.

## 2021-06-25 NOTE — TELEPHONE ENCOUNTER
Caller: Tessa Kilpatrick    Relationship: Self    Best call back number: [phone]    Caller requesting test results: SELF    What test was performed: CT LIVER    When was the test performed: 06/22    Where was the test performed: [facility]    Additional notes:

## 2021-12-06 NOTE — PROGRESS NOTES
Hematology/Oncology Outpatient Follow Up    PATIENT NAME:Tessa Kilpatrick  :1950  MRN: [id number]  PRIMARY CARE PHYSICIAN: Sharona Pineda MD  REFERRING PHYSICIAN: Sharona Pineda MD    Chief Complaint   Patient presents with   • Follow-up     Malignant neoplasm of right female breast        HISTORY OF PRESENT ILLNESS:     This is a 71 y.o.female who in  was diagnosed with right breast cancer.  At the time patient was diagnosed with clinical stage III disease and underwent right modified mastectomy for invasive ductal carcinoma measuring 4.2 cm in size.  Seven out of nine lymph nodes were positive for metastatic disease.  Additional lymph node dissection revealed 6 out of 11 lymph nodes were positive for metastatic disease.  Her pathology stage was T2N3Mx.  Her right breast cancer was positive for estrogen receptor and negative for progesterone receptor, Ki-67 was positive at 36%.  HER-2/nickie by FISH was not amplified.  In 2009 patient received adjuvant chemotherapy with TAC which is combination of Adriamycin, Cytoxan and Taxotere for a total of eight cycles initiated in 3/26/09 and completed on 09.  This was subsequently followed by adjuvant radiation treatment and currently patient is on Femara since 2009.  She developed abnormal enhancement on her imaging studies and for that reason patient underwent prophylactic left mastectomy.  So far patient has tolerated Femara fairly well.  She was prescribed Arimidex briefly but due to intolerance this was discontinued.  Patient said she had her last bone density test approximately 18 months ago and this was within normal limits.  Patient has since them moved to this area and she wants to establish with medical oncology for continuity of care.    • 2009 - Patient underwent left simple mastectomy.  Pathology revealed benign breast tissue with fibrocystic changes and fibroadenoma.    • 4/22/15 - WBC 6.9, hemoglobin 12.1,  platelet count 231,000.  CMP showed normal liver function tests.  BUN 11.  Creatinine 0.6.   • 10/27/15 - Total cholesterol of 190,  (normal < 100), liver function tests (N).  • 11/3/15 - Anemia work up:  reticulocyte count (N) 0.91, B12 255, ferritin 78 (N), folate > 24, haptoglobin elevated to 207, LDH (N) 169, TIBC 404, serum iron (N) 55, iron saturation 14.  SPEP with immunofixation assay was negative for monoclonal gammopathy.    • 2/2/16 - Serum transferrin assay (N) 2.9, methylmalonic acid (N) 170.    • EGD and colonoscopy done revealed normal EGD and normal colonoscopy.  Follow up in 10 years was recommended for repeat colonoscopy.   • 3/24/16 - Comprehensive genetic analysis with [facility] which was essentially negative for any clinically significant mutation.    • 10/8/16 - Fasting lipid profile which showed a high cholesterol at 230, triglyceride (H) 226, LDL (H) 126.  Results were faxed to Dr. Pineda.  Chemistry panel:  BUN 13, creatinine 0.6.  Liver function tests (N).    • 12/12/17 - DEXA scan showed osteopenia.   • 5/14/18 - CBC:  WBC 4.9, hemoglobin 11.8, platelet count 249,000.  Differentials are 53% neutrophils, 29% lymphocytes.  There was no monocytosis, eosinophilia or basophilia.  B12 level was low-normal at 200.  Ferritin 38.  Folate normal 21.  BUN 17.  Creatinine 0.7.    • 5/16/18 - Urinalysis showed trace blood, otherwise negative.  Urine culture did not grow any significant organisms.    • 5/16/18 - Patient was placed on iron sulfate 325 mg p.o. daily and B12 injections.  She was also given a referral to GI.    • 6/13/18 - Patient seen by Dr. Donnelly.  Hemoccult stools were checked.   • Urinalysis at the last visit showed small blood.  Urine culture was negative for infection.  Patient was referred to Dr. Otilio Tong who she saw on 11/16/18.  Dr. Tong recommended CT scans of the abdomen and pelvis and also cystoscopy.    • 12/13/18 - Fasting lipid panel with total cholesterol of 197,  triglyceride 124, HDL was 60, LDL was 112.  • 7/2019 Completed 10 years Femara treatment   • 12/27/2019- Patient had bone density which showed osteopenia.    Past Medical History:   Diagnosis Date   • Breast cancer (HCC)    • Fibromyalgia    • GERD (gastroesophageal reflux disease)    • Hypercholesteremia    • IBS (irritable bowel syndrome)    • Mitral valve prolapse    • Osteoarthritis    • Seasonal allergies        Past Surgical History:   Procedure Laterality Date   • APPENDECTOMY     • CATARACT EXTRACTION, BILATERAL     • GALLBLADDER SURGERY  10/27/2020    Dr. Lima    • GANGLION CYST EXCISION      wrist   • HYSTERECTOMY     • MASTECTOMY           Current Outpatient Medications:   •  cyanocobalamin 1000 MCG/ML injection, INJECT 1 ML (CC) INTRAMUSCULARLY ONCE EVERY MONTH, Disp: 1 mL, Rfl: 3  •  diclofenac (VOLTAREN) 75 MG EC tablet, Take 75 mg by mouth 2 (Two) Times a Day., Disp: , Rfl: 4  •  dicyclomine (BENTYL) 20 MG tablet, , Disp: , Rfl:   •  dilTIAZem (TIAZAC) 240 MG 24 hr capsule, 240 mg Daily., Disp: , Rfl:   •  escitalopram (LEXAPRO) 10 MG tablet, Take 10 mg by mouth Daily., Disp: , Rfl: 4  •  montelukast (SINGULAIR) 10 MG tablet, Take 10 mg by mouth Daily., Disp: , Rfl: 3  •  omeprazole (priLOSEC) 40 MG capsule, TAKE 1 CAPSULE BY MOUTH ONCE DAILY FOR REFLUX, Disp: , Rfl: 12  •  pravastatin (PRAVACHOL) 80 MG tablet, Take 80 mg by mouth Daily., Disp: , Rfl: 4    Allergies   Allergen Reactions   • Atenolol Unknown (See Comments)     Not known    • Codeine Unknown (See Comments)     Compounded drug    • Fiorinal [Butalbital-Aspirin-Caffeine] Unknown (See Comments)     Not known    • Morphine Unknown (See Comments)     Unknown    • Valium [Diazepam] Unknown (See Comments)     Not known        Family History   Problem Relation Age of Onset   • Breast cancer Paternal Aunt 75       Cancer-related family history includes Breast cancer (age of onset: 75) in her paternal aunt.    Social History     Tobacco Use  "  • Smoking status: Former Smoker     Types: Cigarettes   • Smokeless tobacco: Not on file   • Tobacco comment: 40 years ago    Substance Use Topics   • Alcohol use: No   • Drug use: No       HPI, ROS and PFSH have been reviewed and confirmed on 12/7/2021.     SUBJECTIVE:    The patient presented for a scheduled follow up appointment unaccompanied. She had gallbladder surgery 10/27/2020.    She has no chest wall issues.  She does not have any new diagnoses since the last visit        REVIEW OF SYSTEMS:    Review of Systems   Constitutional: Positive for fatigue. Negative for chills and fever.   HENT: Negative for ear pain, mouth sores, nosebleeds and sore throat.    Eyes: Negative for photophobia and visual disturbance.        Wears glasses    Respiratory: Negative for wheezing and stridor.         Snoring at night    Cardiovascular: Negative for chest pain and palpitations.   Gastrointestinal: Negative for abdominal pain, diarrhea, nausea and vomiting.   Endocrine: Negative for cold intolerance and heat intolerance.   Genitourinary: Negative for dysuria and hematuria.   Musculoskeletal: Negative for joint swelling and neck stiffness.   Skin: Negative for color change and rash.   Neurological: Negative for dizziness, seizures, syncope and light-headedness.   Hematological: Negative for adenopathy.        No obvious bleeding   Psychiatric/Behavioral: Negative for agitation, confusion and hallucinations.   All other systems reviewed and are negative.    OBJECTIVE:    Vitals:    12/07/21 0922   BP: 125/79   Pulse: 84   Resp: 18   Temp: 96.6 °F (35.9 °C)   TempSrc: Infrared   Weight: 74.4 kg (164 lb)   Height: 162.6 cm (64\")   PainSc: 0-No pain       ECOG    (0) Fully active, able to carry on all predisease performance without restriction    Physical Exam   Constitutional: She is oriented to person, place, and time. No distress.   HENT:   Head: Normocephalic and atraumatic.   Eyes: Conjunctivae are normal. Right eye " exhibits no discharge. Left eye exhibits no discharge. No scleral icterus.   Neck: No thyromegaly present.   Cardiovascular: Normal rate, regular rhythm and normal heart sounds. Exam reveals no gallop and no friction rub.   Pulmonary/Chest: Effort normal. No stridor. No respiratory distress. She has no wheezes. She exhibits no mass, no tenderness, no edema and no swelling.   Bilateral mastectomy - chest wall exam was negative  Bilateral axillary exam was negative   Abdominal: Soft. Bowel sounds are normal. She exhibits no mass. There is no abdominal tenderness. There is no rebound and no guarding.   Musculoskeletal: Normal range of motion. No tenderness.   Lymphadenopathy:     She has no cervical adenopathy.   Neurological: She is alert and oriented to person, place, and time. She exhibits normal muscle tone.   Skin: Skin is warm. No rash noted. She is not diaphoretic. No erythema.   Psychiatric: Her behavior is normal.   Nursing note and vitals reviewed.    I have reexamined the patient and the results are consistent with the previously documented exam. Elisa Telma Jacob MD     RECENT LABS    WBC   Date Value Ref Range Status   12/07/2021 6.56 3.40 - 10.80 10*3/mm3 Final     RBC   Date Value Ref Range Status   12/07/2021 4.53 3.77 - 5.28 10*6/mm3 Final     Hemoglobin   Date Value Ref Range Status   12/07/2021 13.3 12.0 - 15.9 g/dL Final     Hematocrit   Date Value Ref Range Status   12/07/2021 41.3 34.0 - 46.6 % Final     MCV   Date Value Ref Range Status   12/07/2021 91.2 79.0 - 97.0 fL Final     MCH   Date Value Ref Range Status   12/07/2021 29.4 26.6 - 33.0 pg Final     MCHC   Date Value Ref Range Status   12/07/2021 32.2 31.5 - 35.7 g/dL Final     RDW   Date Value Ref Range Status   12/07/2021 13.0 12.3 - 15.4 % Final     RDW-SD   Date Value Ref Range Status   12/07/2021 42.2 37.0 - 54.0 fl Final     MPV   Date Value Ref Range Status   12/07/2021 9.4 6.0 - 12.0 fL Final     Platelets   Date Value Ref Range  Status   12/07/2021 295 140 - 450 10*3/mm3 Final     Neutrophil %   Date Value Ref Range Status   12/07/2021 56.0 42.7 - 76.0 % Final     Lymphocyte %   Date Value Ref Range Status   12/07/2021 29.7 19.6 - 45.3 % Final     Monocyte %   Date Value Ref Range Status   12/07/2021 9.5 5.0 - 12.0 % Final     Eosinophil %   Date Value Ref Range Status   12/07/2021 3.4 0.3 - 6.2 % Final     Basophil %   Date Value Ref Range Status   12/07/2021 1.4 0.0 - 1.5 % Final     Neutrophils, Absolute   Date Value Ref Range Status   12/07/2021 3.68 1.70 - 7.00 10*3/mm3 Final     Lymphocytes, Absolute   Date Value Ref Range Status   12/07/2021 1.95 0.70 - 3.10 10*3/mm3 Final     Monocytes, Absolute   Date Value Ref Range Status   12/07/2021 0.62 0.10 - 0.90 10*3/mm3 Final     Eosinophils, Absolute   Date Value Ref Range Status   12/07/2021 0.22 0.00 - 0.40 10*3/mm3 Final     Basophils, Absolute   Date Value Ref Range Status   12/07/2021 0.09 0.00 - 0.20 10*3/mm3 Final       Lab Results   Component Value Date    GLUCOSE 105 (H) 06/03/2021    BUN 11 06/03/2021    CREATININE 0.73 06/03/2021    EGFRIFNONA 79 06/03/2021    BCR 15.1 06/03/2021    K 3.9 06/03/2021    CO2 25.0 06/03/2021    CALCIUM 9.6 06/03/2021    ALBUMIN 4.60 06/03/2021    LABIL2 1.3 03/13/2019    AST 39 (H) 06/03/2021    ALT 47 (H) 06/03/2021         ASSESSMENT:    1. Clinical stage III invasive ductal carcinoma involving the right breast status post right modified mastectomy followed by adjuvant chemotherapy with TAC for eight cycles, followed by adjuvant right chest wall and markie radiation and currently on Femara since November 2009.  So far patient does not have any clinical evidence of relapse disease.  Patient has d/c Femara in 7/2019.  Continue surveillance  2. Status post left prophylactic mastectomy with benign pathology.   Reviewed  3. Anemia secondary to iron deficiency and B12 deficiency, on B12 injection.  Off iron supplementation but her hemoglobin remained  stable  4. Comprehensive genetic analysis with [facility] was essentially negative for any clinically significant mutation.    5. Hypertriglyceridemia.  Primary care physician  6. Osteopenia, off Prolia now due to discontinuation of AI.   Continued on calcium + vitamin D.   7. Hematuria.  Work up by Dr. Otilio Tong with [facility].     PLANS:    1. Her clinical exam today remains unremarkable  2. CMP today  3. Schedule bone density test which is due now December 2021  4. She will continue Os-Phillip D for osteopenia.  Patient is now off Prolia  5. Patient is off B12 injections at this time   6. Consider Evista if patient develops osteoporosis   7. Continue breast self examination   8. RTC in 6 months earlier as needed       I have reviewed labs results, imaging, vitals, and medications with the patient today.  CBC is normal today and was reviewed with the patient.    Lab Results   Component Value Date    WBC 6.56 12/07/2021    HGB 13.3 12/07/2021    HCT 41.3 12/07/2021    MCV 91.2 12/07/2021     12/07/2021     Patient verbalized understanding and is in agreement of the above plan.      I spent 30 total minutes, face-to-face, caring for Tessa today.  90% of this time involved counseling and/or coordination of care as documented within this note.

## 2021-12-07 ENCOUNTER — LAB (OUTPATIENT)
Dept: LAB | Facility: HOSPITAL | Age: 71
End: 2021-12-07

## 2021-12-07 ENCOUNTER — OFFICE VISIT (OUTPATIENT)
Dept: ONCOLOGY | Facility: CLINIC | Age: 71
End: 2021-12-07

## 2021-12-07 VITALS
WEIGHT: 164 LBS | HEART RATE: 84 BPM | RESPIRATION RATE: 18 BRPM | HEIGHT: 64 IN | SYSTOLIC BLOOD PRESSURE: 125 MMHG | DIASTOLIC BLOOD PRESSURE: 79 MMHG | TEMPERATURE: 96.6 F | BODY MASS INDEX: 28 KG/M2

## 2021-12-07 DIAGNOSIS — C50.911 MALIGNANT NEOPLASM OF RIGHT FEMALE BREAST, UNSPECIFIED ESTROGEN RECEPTOR STATUS, UNSPECIFIED SITE OF BREAST (HCC): Primary | ICD-10-CM

## 2021-12-07 DIAGNOSIS — Z78.0 POST-MENOPAUSAL: ICD-10-CM

## 2021-12-07 LAB
ALBUMIN SERPL-MCNC: 4.6 G/DL (ref 3.5–5.2)
ALBUMIN/GLOB SERPL: 1.6 G/DL
ALP SERPL-CCNC: 75 U/L (ref 39–117)
ALT SERPL W P-5'-P-CCNC: 42 U/L (ref 1–33)
ANION GAP SERPL CALCULATED.3IONS-SCNC: 11 MMOL/L (ref 5–15)
AST SERPL-CCNC: 35 U/L (ref 1–32)
BASOPHILS # BLD AUTO: 0.09 10*3/MM3 (ref 0–0.2)
BASOPHILS NFR BLD AUTO: 1.4 % (ref 0–1.5)
BILIRUB SERPL-MCNC: 0.4 MG/DL (ref 0–1.2)
BUN SERPL-MCNC: 13 MG/DL (ref 8–23)
BUN/CREAT SERPL: 18.3 (ref 7–25)
CALCIUM SPEC-SCNC: 9.3 MG/DL (ref 8.6–10.5)
CHLORIDE SERPL-SCNC: 102 MMOL/L (ref 98–107)
CO2 SERPL-SCNC: 27 MMOL/L (ref 22–29)
CREAT SERPL-MCNC: 0.71 MG/DL (ref 0.57–1)
DEPRECATED RDW RBC AUTO: 42.2 FL (ref 37–54)
EOSINOPHIL # BLD AUTO: 0.22 10*3/MM3 (ref 0–0.4)
EOSINOPHIL NFR BLD AUTO: 3.4 % (ref 0.3–6.2)
ERYTHROCYTE [DISTWIDTH] IN BLOOD BY AUTOMATED COUNT: 13 % (ref 12.3–15.4)
GFR SERPL CREATININE-BSD FRML MDRD: 82 ML/MIN/1.73
GLOBULIN UR ELPH-MCNC: 2.9 GM/DL
GLUCOSE SERPL-MCNC: 82 MG/DL (ref 65–99)
HCT VFR BLD AUTO: 41.3 % (ref 34–46.6)
HGB BLD-MCNC: 13.3 G/DL (ref 12–15.9)
HOLD SPECIMEN: NORMAL
HOLD SPECIMEN: NORMAL
LYMPHOCYTES # BLD AUTO: 1.95 10*3/MM3 (ref 0.7–3.1)
LYMPHOCYTES NFR BLD AUTO: 29.7 % (ref 19.6–45.3)
MCH RBC QN AUTO: 29.4 PG (ref 26.6–33)
MCHC RBC AUTO-ENTMCNC: 32.2 G/DL (ref 31.5–35.7)
MCV RBC AUTO: 91.2 FL (ref 79–97)
MONOCYTES # BLD AUTO: 0.62 10*3/MM3 (ref 0.1–0.9)
MONOCYTES NFR BLD AUTO: 9.5 % (ref 5–12)
NEUTROPHILS NFR BLD AUTO: 3.68 10*3/MM3 (ref 1.7–7)
NEUTROPHILS NFR BLD AUTO: 56 % (ref 42.7–76)
PLATELET # BLD AUTO: 295 10*3/MM3 (ref 140–450)
PMV BLD AUTO: 9.4 FL (ref 6–12)
POTASSIUM SERPL-SCNC: 3.9 MMOL/L (ref 3.5–5.2)
PROT SERPL-MCNC: 7.5 G/DL (ref 6–8.5)
RBC # BLD AUTO: 4.53 10*6/MM3 (ref 3.77–5.28)
SODIUM SERPL-SCNC: 140 MMOL/L (ref 136–145)
WBC NRBC COR # BLD: 6.56 10*3/MM3 (ref 3.4–10.8)

## 2021-12-07 PROCEDURE — 80053 COMPREHEN METABOLIC PANEL: CPT

## 2021-12-07 PROCEDURE — 36415 COLL VENOUS BLD VENIPUNCTURE: CPT

## 2021-12-07 PROCEDURE — 99214 OFFICE O/P EST MOD 30 MIN: CPT | Performed by: INTERNAL MEDICINE

## 2021-12-07 PROCEDURE — 85025 COMPLETE CBC W/AUTO DIFF WBC: CPT

## 2021-12-29 ENCOUNTER — HOSPITAL ENCOUNTER (OUTPATIENT)
Dept: BONE DENSITY | Facility: HOSPITAL | Age: 71
Discharge: HOME | End: 2021-12-29
Admitting: INTERNAL MEDICINE

## 2021-12-29 DIAGNOSIS — M85.9 DISORDER OF BONE DENSITY AND STRUCTURE, UNSPECIFIED: ICD-10-CM

## 2021-12-29 DIAGNOSIS — M81.6 LOCALIZED OSTEOPOROSIS (LEQUESNE): ICD-10-CM

## 2021-12-29 DIAGNOSIS — C50.911 MALIGNANT NEOPLASM OF RIGHT FEMALE BREAST, UNSPECIFIED ESTROGEN RECEPTOR STATUS, UNSPECIFIED SITE OF BREAST (HCC): ICD-10-CM

## 2021-12-29 DIAGNOSIS — M85.859 OSTEOPENIA OF NECK OF FEMUR, UNSPECIFIED LATERALITY: ICD-10-CM

## 2021-12-29 PROCEDURE — 77080 DXA BONE DENSITY AXIAL: CPT

## 2021-12-30 ENCOUNTER — TELEPHONE (OUTPATIENT)
Dept: ONCOLOGY | Facility: CLINIC | Age: 71
End: 2021-12-30

## 2021-12-30 NOTE — TELEPHONE ENCOUNTER
Called pt to let her know that she has persistent osteopenia and should continue calcium and vitamin D twice daily. She verbalized understanding.

## 2021-12-30 NOTE — TELEPHONE ENCOUNTER
----- Message from Elisa Jacob MD sent at 12/30/2021  9:53 AM EST -----  She has persistent osteopenia. She should continue calcium and vitamin D twice a day.

## 2022-01-11 ENCOUNTER — OFFICE (OUTPATIENT)
Dept: URBAN - METROPOLITAN AREA CLINIC 64 | Facility: CLINIC | Age: 72
End: 2022-01-11

## 2022-01-11 VITALS
HEIGHT: 64 IN | DIASTOLIC BLOOD PRESSURE: 71 MMHG | SYSTOLIC BLOOD PRESSURE: 116 MMHG | WEIGHT: 167 LBS | HEART RATE: 92 BPM

## 2022-01-11 DIAGNOSIS — K20.0 EOSINOPHILIC ESOPHAGITIS: ICD-10-CM

## 2022-01-11 PROCEDURE — 99214 OFFICE O/P EST MOD 30 MIN: CPT | Performed by: NURSE PRACTITIONER

## 2022-02-16 ENCOUNTER — ON CAMPUS - OUTPATIENT (OUTPATIENT)
Dept: URBAN - METROPOLITAN AREA HOSPITAL 77 | Facility: HOSPITAL | Age: 72
End: 2022-02-16

## 2022-02-16 DIAGNOSIS — K63.5 POLYP OF COLON: ICD-10-CM

## 2022-02-16 DIAGNOSIS — R19.4 CHANGE IN BOWEL HABIT: ICD-10-CM

## 2022-02-16 DIAGNOSIS — R10.31 RIGHT LOWER QUADRANT PAIN: ICD-10-CM

## 2022-02-16 DIAGNOSIS — R10.13 EPIGASTRIC PAIN: ICD-10-CM

## 2022-02-16 DIAGNOSIS — K64.4 RESIDUAL HEMORRHOIDAL SKIN TAGS: ICD-10-CM

## 2022-02-16 DIAGNOSIS — K57.30 DIVERTICULOSIS OF LARGE INTESTINE WITHOUT PERFORATION OR ABS: ICD-10-CM

## 2022-02-16 DIAGNOSIS — K29.50 UNSPECIFIED CHRONIC GASTRITIS WITHOUT BLEEDING: ICD-10-CM

## 2022-02-16 DIAGNOSIS — K44.9 DIAPHRAGMATIC HERNIA WITHOUT OBSTRUCTION OR GANGRENE: ICD-10-CM

## 2022-02-16 PROCEDURE — 45385 COLONOSCOPY W/LESION REMOVAL: CPT | Performed by: INTERNAL MEDICINE

## 2022-02-16 PROCEDURE — 43239 EGD BIOPSY SINGLE/MULTIPLE: CPT | Performed by: INTERNAL MEDICINE

## 2022-04-08 ENCOUNTER — TELEPHONE (OUTPATIENT)
Dept: ONCOLOGY | Facility: CLINIC | Age: 72
End: 2022-04-08

## 2022-04-14 ENCOUNTER — OFFICE (OUTPATIENT)
Dept: URBAN - METROPOLITAN AREA CLINIC 64 | Facility: CLINIC | Age: 72
End: 2022-04-14

## 2022-04-14 VITALS
WEIGHT: 168 LBS | HEIGHT: 64 IN | SYSTOLIC BLOOD PRESSURE: 135 MMHG | HEART RATE: 96 BPM | DIASTOLIC BLOOD PRESSURE: 83 MMHG

## 2022-04-14 DIAGNOSIS — R10.9 UNSPECIFIED ABDOMINAL PAIN: ICD-10-CM

## 2022-04-14 DIAGNOSIS — R15.0 INCOMPLETE DEFECATION: ICD-10-CM

## 2022-04-14 PROCEDURE — 99214 OFFICE O/P EST MOD 30 MIN: CPT | Performed by: NURSE PRACTITIONER

## 2022-04-14 RX ORDER — SUCRALFATE 1 G/1
3 TABLET ORAL
Qty: 120 | Refills: 1 | Status: COMPLETED
Start: 2022-04-14 | End: 2022-06-16

## 2022-04-14 RX ORDER — ESOMEPRAZOLE MAGNESIUM 40 MG/1
40 CAPSULE, DELAYED RELEASE ORAL
Qty: 90 | Refills: 3 | Status: COMPLETED
Start: 2022-04-14 | End: 2022-06-16

## 2022-06-06 NOTE — PROGRESS NOTES
Hematology/Oncology Outpatient Follow Up    PATIENT NAME:Tessa Kilpatrick  :1950  MRN: [id number]  PRIMARY CARE PHYSICIAN: Sahrona Pineda MD  REFERRING PHYSICIAN: Sharona Pineda MD    Chief Complaint   Patient presents with   • Follow-up     Malignant neoplasm of right female breast, unspecified estrogen receptor status, unspecified site of breast (HCC)        HISTORY OF PRESENT ILLNESS:     This is a 71 y.o.female who in  was diagnosed with right breast cancer.  At the time patient was diagnosed with clinical stage III disease and underwent right modified mastectomy for invasive ductal carcinoma measuring 4.2 cm in size.  Seven out of nine lymph nodes were positive for metastatic disease.  Additional lymph node dissection revealed 6 out of 11 lymph nodes were positive for metastatic disease.  Her pathology stage was T2N3Mx.  Her right breast cancer was positive for estrogen receptor and negative for progesterone receptor, Ki-67 was positive at 36%.  HER-2/nickie by FISH was not amplified.  In 2009 patient received adjuvant chemotherapy with TAC which is combination of Adriamycin, Cytoxan and Taxotere for a total of eight cycles initiated in 3/26/09 and completed on 09.  This was subsequently followed by adjuvant radiation treatment and currently patient is on Femara since 2009.  She developed abnormal enhancement on her imaging studies and for that reason patient underwent prophylactic left mastectomy.  So far patient has tolerated Femara fairly well.  She was prescribed Arimidex briefly but due to intolerance this was discontinued.  Patient said she had her last bone density test approximately 18 months ago and this was within normal limits.  Patient has since them moved to this area and she wants to establish with medical oncology for continuity of care.    • 2009 - Patient underwent left simple mastectomy.  Pathology revealed benign breast tissue with  fibrocystic changes and fibroadenoma.    • 4/22/15 - WBC 6.9, hemoglobin 12.1, platelet count 231,000.  CMP showed normal liver function tests.  BUN 11.  Creatinine 0.6.   • 10/27/15 - Total cholesterol of 190,  (normal < 100), liver function tests (N).  • 11/3/15 - Anemia work up:  reticulocyte count (N) 0.91, B12 255, ferritin 78 (N), folate > 24, haptoglobin elevated to 207, LDH (N) 169, TIBC 404, serum iron (N) 55, iron saturation 14.  SPEP with immunofixation assay was negative for monoclonal gammopathy.    • 2/2/16 - Serum transferrin assay (N) 2.9, methylmalonic acid (N) 170.    • EGD and colonoscopy done revealed normal EGD and normal colonoscopy.  Follow up in 10 years was recommended for repeat colonoscopy.   • 3/24/16 - Comprehensive genetic analysis with [facility] which was essentially negative for any clinically significant mutation.    • 10/8/16 - Fasting lipid profile which showed a high cholesterol at 230, triglyceride (H) 226, LDL (H) 126.  Results were faxed to Dr. Pineda.  Chemistry panel:  BUN 13, creatinine 0.6.  Liver function tests (N).    • 12/12/17 - DEXA scan showed osteopenia.   • 5/14/18 - CBC:  WBC 4.9, hemoglobin 11.8, platelet count 249,000.  Differentials are 53% neutrophils, 29% lymphocytes.  There was no monocytosis, eosinophilia or basophilia.  B12 level was low-normal at 200.  Ferritin 38.  Folate normal 21.  BUN 17.  Creatinine 0.7.    • 5/16/18 - Urinalysis showed trace blood, otherwise negative.  Urine culture did not grow any significant organisms.    • 5/16/18 - Patient was placed on iron sulfate 325 mg p.o. daily and B12 injections.  She was also given a referral to GI.    • 6/13/18 - Patient seen by Dr. Donnelly.  Hemoccult stools were checked.   • Urinalysis at the last visit showed small blood.  Urine culture was negative for infection.  Patient was referred to Dr. Otilio Tong who she saw on 11/16/18.  Dr. Tong recommended CT scans of the abdomen and pelvis and also  cystoscopy.    • 12/13/18 - Fasting lipid panel with total cholesterol of 197, triglyceride 124, HDL was 60, LDL was 112.  • 7/2019 Completed 10 years Femara treatment   • 12/27/2019- Patient had bone density which showed osteopenia.    Past Medical History:   Diagnosis Date   • Breast cancer (HCC)    • Fibromyalgia    • GERD (gastroesophageal reflux disease)    • Hypercholesteremia    • IBS (irritable bowel syndrome)    • Mitral valve prolapse    • Osteoarthritis    • Seasonal allergies        Past Surgical History:   Procedure Laterality Date   • APPENDECTOMY     • CATARACT EXTRACTION, BILATERAL     • GALLBLADDER SURGERY  10/27/2020    Dr. Lima    • GANGLION CYST EXCISION      wrist   • HYSTERECTOMY     • MASTECTOMY           Current Outpatient Medications:   •  cyanocobalamin 1000 MCG/ML injection, INJECT 1 ML (CC) INTRAMUSCULARLY ONCE EVERY MONTH, Disp: 1 mL, Rfl: 3  •  diclofenac (VOLTAREN) 75 MG EC tablet, Take 75 mg by mouth 2 (Two) Times a Day., Disp: , Rfl: 4  •  dicyclomine (BENTYL) 20 MG tablet, , Disp: , Rfl:   •  dilTIAZem (TIAZAC) 240 MG 24 hr capsule, 240 mg Daily., Disp: , Rfl:   •  escitalopram (LEXAPRO) 10 MG tablet, Take 10 mg by mouth Daily., Disp: , Rfl: 4  •  montelukast (SINGULAIR) 10 MG tablet, Take 10 mg by mouth Daily., Disp: , Rfl: 3  •  omeprazole (priLOSEC) 40 MG capsule, TAKE 1 CAPSULE BY MOUTH ONCE DAILY FOR REFLUX, Disp: , Rfl: 12  •  pravastatin (PRAVACHOL) 80 MG tablet, Take 80 mg by mouth Daily., Disp: , Rfl: 4  •  sucralfate (CARAFATE) 1 g tablet, Take 1 g by mouth 3 (Three) Times a Day As Needed., Disp: , Rfl:     Allergies   Allergen Reactions   • Atenolol Unknown (See Comments)     Not known    • Codeine Unknown (See Comments)     Compounded drug    • Fiorinal [Butalbital-Aspirin-Caffeine] Unknown (See Comments)     Not known    • Morphine Unknown (See Comments)     Unknown    • Valium [Diazepam] Unknown (See Comments)     Not known        Family History   Problem Relation  "Age of Onset   • Breast cancer Paternal Aunt 75       Cancer-related family history includes Breast cancer (age of onset: 75) in her paternal aunt.    Social History     Tobacco Use   • Smoking status: Former Smoker     Types: Cigarettes   • Tobacco comment: 40 years ago    Substance Use Topics   • Alcohol use: No   • Drug use: No       HPI, ROS and PFSH have been reviewed and confirmed on 6/9/2022.     SUBJECTIVE:    Patient does not have any new issues today.  She denies chest wall mass        REVIEW OF SYSTEMS:    Review of Systems   Constitutional: Positive for fatigue. Negative for chills and fever.   HENT: Negative for ear pain, mouth sores, nosebleeds and sore throat.    Eyes: Negative for photophobia and visual disturbance.        Wears glasses    Respiratory: Negative for wheezing and stridor.         Snoring at night    Cardiovascular: Negative for chest pain and palpitations.   Gastrointestinal: Negative for abdominal pain, diarrhea, nausea and vomiting.   Endocrine: Negative for cold intolerance and heat intolerance.   Genitourinary: Negative for dysuria and hematuria.   Musculoskeletal: Negative for joint swelling and neck stiffness.   Skin: Negative for color change and rash.   Neurological: Negative for dizziness, seizures, syncope and light-headedness.   Hematological: Negative for adenopathy.        No obvious bleeding   Psychiatric/Behavioral: Negative for agitation, confusion and hallucinations.   All other systems reviewed and are negative.    OBJECTIVE:    Vitals:    06/09/22 1038   BP: 135/84   Pulse: 96   Resp: 18   Temp: 96.6 °F (35.9 °C)   TempSrc: Infrared   Weight: 75.3 kg (166 lb)   Height: 162.6 cm (64\")   PainSc: 0-No pain       ECOG    (0) Fully active, able to carry on all predisease performance without restriction    Physical Exam   Constitutional: She is oriented to person, place, and time. No distress.   HENT:   Head: Normocephalic and atraumatic.   Eyes: Conjunctivae are normal. " Right eye exhibits no discharge. Left eye exhibits no discharge. No scleral icterus.   Neck: No thyromegaly present.   Cardiovascular: Normal rate, regular rhythm and normal heart sounds. Exam reveals no gallop and no friction rub.   Pulmonary/Chest: Effort normal. No stridor. No respiratory distress. She has no wheezes. She exhibits no mass, no tenderness, no edema and no swelling.   Bilateral mastectomy - chest wall exam was negative  Bilateral axillary exam was negative   Abdominal: Soft. Bowel sounds are normal. She exhibits no mass. There is no abdominal tenderness. There is no rebound and no guarding.   Musculoskeletal: Normal range of motion. No tenderness.   Lymphadenopathy:     She has no cervical adenopathy.   Neurological: She is alert and oriented to person, place, and time. She exhibits normal muscle tone.   Skin: Skin is warm. No rash noted. She is not diaphoretic. No erythema.   Psychiatric: Her behavior is normal.   Nursing note and vitals reviewed.    I have reexamined the patient and the results are consistent with the previously documented exam. Elisa Telma Jacob MD     RECENT LABS    WBC   Date Value Ref Range Status   06/09/2022 6.12 3.40 - 10.80 10*3/mm3 Final     RBC   Date Value Ref Range Status   06/09/2022 4.34 3.77 - 5.28 10*6/mm3 Final     Hemoglobin   Date Value Ref Range Status   06/09/2022 12.7 12.0 - 15.9 g/dL Final     Hematocrit   Date Value Ref Range Status   06/09/2022 39.3 34.0 - 46.6 % Final     MCV   Date Value Ref Range Status   06/09/2022 90.6 79.0 - 97.0 fL Final     MCH   Date Value Ref Range Status   06/09/2022 29.3 26.6 - 33.0 pg Final     MCHC   Date Value Ref Range Status   06/09/2022 32.3 31.5 - 35.7 g/dL Final     RDW   Date Value Ref Range Status   06/09/2022 12.8 12.3 - 15.4 % Final     RDW-SD   Date Value Ref Range Status   06/09/2022 41.4 37.0 - 54.0 fl Final     MPV   Date Value Ref Range Status   06/09/2022 9.7 6.0 - 12.0 fL Final     Platelets   Date Value  Ref Range Status   06/09/2022 284 140 - 450 10*3/mm3 Final     Neutrophil %   Date Value Ref Range Status   06/09/2022 47.6 42.7 - 76.0 % Final     Lymphocyte %   Date Value Ref Range Status   06/09/2022 35.5 19.6 - 45.3 % Final     Monocyte %   Date Value Ref Range Status   06/09/2022 10.8 5.0 - 12.0 % Final     Eosinophil %   Date Value Ref Range Status   06/09/2022 4.6 0.3 - 6.2 % Final     Basophil %   Date Value Ref Range Status   06/09/2022 1.5 0.0 - 1.5 % Final     Neutrophils, Absolute   Date Value Ref Range Status   06/09/2022 2.92 1.70 - 7.00 10*3/mm3 Final     Lymphocytes, Absolute   Date Value Ref Range Status   06/09/2022 2.17 0.70 - 3.10 10*3/mm3 Final     Monocytes, Absolute   Date Value Ref Range Status   06/09/2022 0.66 0.10 - 0.90 10*3/mm3 Final     Eosinophils, Absolute   Date Value Ref Range Status   06/09/2022 0.28 0.00 - 0.40 10*3/mm3 Final     Basophils, Absolute   Date Value Ref Range Status   06/09/2022 0.09 0.00 - 0.20 10*3/mm3 Final       Lab Results   Component Value Date    GLUCOSE 82 12/07/2021    BUN 13 12/07/2021    CREATININE 0.71 12/07/2021    EGFRIFNONA 82 12/07/2021    BCR 18.3 12/07/2021    K 3.9 12/07/2021    CO2 27.0 12/07/2021    CALCIUM 9.3 12/07/2021    ALBUMIN 4.60 12/07/2021    LABIL2 1.3 03/13/2019    AST 35 (H) 12/07/2021    ALT 42 (H) 12/07/2021         ASSESSMENT:    1. Clinical stage III invasive ductal carcinoma involving the right breast status post right modified mastectomy followed by adjuvant chemotherapy with TAC for eight cycles, followed by adjuvant right chest wall and markie radiation and currently on Femara since November 2009.  So far patient does not have any clinical evidence of relapse disease.  Patient has d/c Femara in 7/2019.  Continue surveillance  2. Status post left prophylactic mastectomy with benign pathology.  Reviewed.  No chest wall abnormalities identified  3. Anemia secondary to iron deficiency and B12 deficiency, she was on B12 injection.    4. Comprehensive genetic analysis with [facility] was essentially negative for any clinically significant mutation.   5. Elevated LFTs: Follow up with her PCP.  Patient is on statins.  6. Hypertriglyceridemia.  Primary care physician  7. Osteopenia, off Prolia now due to discontinuation of AI.   Continued on calcium + vitamin D.   8. Hematuria.  Work up by Dr. Otilio Tong with [facility].   9. Osteopenia last bone density was December 2021.  Continue calcium and vitamin D    PLANS:    1. Her clinical exam today was unremarkable  2. CMP today, B12 level  3. Bone density test will be due  December 2023  4. She will continue Os-Phillip D for osteopenia.  Patient is now off Prolia  5. Patient is off B12 injections at this time   6. Consider Evista if patient develops osteoporosis   7. Continue calcium vitamin D  8. Continue breast self examination   9. RTC in 6 months or earlier as needed       I have reviewed labs results, imaging, vitals, and medications with the patient today.  CBC is normal today and was reviewed with the patient.    Lab Results   Component Value Date    WBC 6.12 06/09/2022    HGB 12.7 06/09/2022    HCT 39.3 06/09/2022    MCV 90.6 06/09/2022     06/09/2022     Patient verbalized understanding and is in agreement of the above plan.      I spent 30 total minutes, face-to-face, caring for Tessa today.  90% of this time involved counseling and/or coordination of care as documented within this note.

## 2022-06-09 ENCOUNTER — OFFICE VISIT (OUTPATIENT)
Dept: ONCOLOGY | Facility: CLINIC | Age: 72
End: 2022-06-09

## 2022-06-09 ENCOUNTER — LAB (OUTPATIENT)
Dept: LAB | Facility: HOSPITAL | Age: 72
End: 2022-06-09

## 2022-06-09 VITALS
RESPIRATION RATE: 18 BRPM | BODY MASS INDEX: 28.34 KG/M2 | HEIGHT: 64 IN | HEART RATE: 96 BPM | SYSTOLIC BLOOD PRESSURE: 135 MMHG | DIASTOLIC BLOOD PRESSURE: 84 MMHG | TEMPERATURE: 96.6 F | WEIGHT: 166 LBS

## 2022-06-09 DIAGNOSIS — R79.89 ELEVATED LIVER FUNCTION TESTS: ICD-10-CM

## 2022-06-09 DIAGNOSIS — E53.8 B12 DEFICIENCY: ICD-10-CM

## 2022-06-09 DIAGNOSIS — C50.911 MALIGNANT NEOPLASM OF RIGHT FEMALE BREAST, UNSPECIFIED ESTROGEN RECEPTOR STATUS, UNSPECIFIED SITE OF BREAST: Primary | ICD-10-CM

## 2022-06-09 LAB
BASOPHILS # BLD AUTO: 0.09 10*3/MM3 (ref 0–0.2)
BASOPHILS NFR BLD AUTO: 1.5 % (ref 0–1.5)
DEPRECATED RDW RBC AUTO: 41.4 FL (ref 37–54)
EOSINOPHIL # BLD AUTO: 0.28 10*3/MM3 (ref 0–0.4)
EOSINOPHIL NFR BLD AUTO: 4.6 % (ref 0.3–6.2)
ERYTHROCYTE [DISTWIDTH] IN BLOOD BY AUTOMATED COUNT: 12.8 % (ref 12.3–15.4)
HCT VFR BLD AUTO: 39.3 % (ref 34–46.6)
HGB BLD-MCNC: 12.7 G/DL (ref 12–15.9)
HOLD SPECIMEN: NORMAL
HOLD SPECIMEN: NORMAL
LYMPHOCYTES # BLD AUTO: 2.17 10*3/MM3 (ref 0.7–3.1)
LYMPHOCYTES NFR BLD AUTO: 35.5 % (ref 19.6–45.3)
MCH RBC QN AUTO: 29.3 PG (ref 26.6–33)
MCHC RBC AUTO-ENTMCNC: 32.3 G/DL (ref 31.5–35.7)
MCV RBC AUTO: 90.6 FL (ref 79–97)
MONOCYTES # BLD AUTO: 0.66 10*3/MM3 (ref 0.1–0.9)
MONOCYTES NFR BLD AUTO: 10.8 % (ref 5–12)
NEUTROPHILS NFR BLD AUTO: 2.92 10*3/MM3 (ref 1.7–7)
NEUTROPHILS NFR BLD AUTO: 47.6 % (ref 42.7–76)
PLATELET # BLD AUTO: 284 10*3/MM3 (ref 140–450)
PMV BLD AUTO: 9.7 FL (ref 6–12)
RBC # BLD AUTO: 4.34 10*6/MM3 (ref 3.77–5.28)
WBC NRBC COR # BLD: 6.12 10*3/MM3 (ref 3.4–10.8)

## 2022-06-09 PROCEDURE — 99214 OFFICE O/P EST MOD 30 MIN: CPT | Performed by: INTERNAL MEDICINE

## 2022-06-09 PROCEDURE — 85025 COMPLETE CBC W/AUTO DIFF WBC: CPT

## 2022-06-09 PROCEDURE — 36415 COLL VENOUS BLD VENIPUNCTURE: CPT

## 2022-06-09 RX ORDER — SUCRALFATE 1 G/1
1 TABLET ORAL 3 TIMES DAILY PRN
COMMUNITY
Start: 2022-04-14

## 2022-06-16 ENCOUNTER — OFFICE (OUTPATIENT)
Dept: URBAN - METROPOLITAN AREA CLINIC 64 | Facility: CLINIC | Age: 72
End: 2022-06-16

## 2022-06-16 VITALS
DIASTOLIC BLOOD PRESSURE: 99 MMHG | SYSTOLIC BLOOD PRESSURE: 136 MMHG | HEART RATE: 94 BPM | WEIGHT: 166 LBS | HEIGHT: 64 IN

## 2022-06-16 DIAGNOSIS — R10.9 UNSPECIFIED ABDOMINAL PAIN: ICD-10-CM

## 2022-06-16 PROCEDURE — 99213 OFFICE O/P EST LOW 20 MIN: CPT | Performed by: NURSE PRACTITIONER

## 2022-11-02 ENCOUNTER — OFFICE (OUTPATIENT)
Dept: URBAN - METROPOLITAN AREA CLINIC 64 | Facility: CLINIC | Age: 72
End: 2022-11-02

## 2022-11-02 VITALS
SYSTOLIC BLOOD PRESSURE: 109 MMHG | WEIGHT: 172 LBS | HEIGHT: 64 IN | HEART RATE: 92 BPM | DIASTOLIC BLOOD PRESSURE: 74 MMHG

## 2022-11-02 DIAGNOSIS — R19.7 DIARRHEA, UNSPECIFIED: ICD-10-CM

## 2022-11-02 PROCEDURE — 99213 OFFICE O/P EST LOW 20 MIN: CPT | Performed by: INTERNAL MEDICINE

## 2022-12-05 NOTE — PROGRESS NOTES
Hematology/Oncology Outpatient Follow Up    PATIENT NAME:Tessa Kilpatrick  :1950  MRN: [id number]  PRIMARY CARE PHYSICIAN: Sharona Pineda MD  REFERRING PHYSICIAN: Sharona Pineda MD    Chief Complaint   Patient presents with   • Follow-up     Malignant neoplasm of right female breast, unspecified estrogen receptor status, unspecified site of breast (HCC)        HISTORY OF PRESENT ILLNESS:     This is a 72 y.o.female who in  was diagnosed with right breast cancer.  At the time patient was diagnosed with clinical stage III disease and underwent right modified mastectomy for invasive ductal carcinoma measuring 4.2 cm in size.  Seven out of nine lymph nodes were positive for metastatic disease.  Additional lymph node dissection revealed 6 out of 11 lymph nodes were positive for metastatic disease.  Her pathology stage was T2N3Mx.  Her right breast cancer was positive for estrogen receptor and negative for progesterone receptor, Ki-67 was positive at 36%.  HER-2/nickie by FISH was not amplified.  In 2009 patient received adjuvant chemotherapy with TAC which is combination of Adriamycin, Cytoxan and Taxotere for a total of eight cycles initiated in 3/26/09 and completed on 09.  This was subsequently followed by adjuvant radiation treatment and currently patient is on Femara since 2009.  She developed abnormal enhancement on her imaging studies and for that reason patient underwent prophylactic left mastectomy.  So far patient has tolerated Femara fairly well.  She was prescribed Arimidex briefly but due to intolerance this was discontinued.  Patient said she had her last bone density test approximately 18 months ago and this was within normal limits.  Patient has since them moved to this area and she wants to establish with medical oncology for continuity of care.    • 2009 - Patient underwent left simple mastectomy.  Pathology revealed benign breast tissue with  fibrocystic changes and fibroadenoma.    • 4/22/15 - WBC 6.9, hemoglobin 12.1, platelet count 231,000.  CMP showed normal liver function tests.  BUN 11.  Creatinine 0.6.   • 10/27/15 - Total cholesterol of 190,  (normal < 100), liver function tests (N).  • 11/3/15 - Anemia work up:  reticulocyte count (N) 0.91, B12 255, ferritin 78 (N), folate > 24, haptoglobin elevated to 207, LDH (N) 169, TIBC 404, serum iron (N) 55, iron saturation 14.  SPEP with immunofixation assay was negative for monoclonal gammopathy.    • 2/2/16 - Serum transferrin assay (N) 2.9, methylmalonic acid (N) 170.    • EGD and colonoscopy done revealed normal EGD and normal colonoscopy.  Follow up in 10 years was recommended for repeat colonoscopy.   • 3/24/16 - Comprehensive genetic analysis with [facility] which was essentially negative for any clinically significant mutation.    • 10/8/16 - Fasting lipid profile which showed a high cholesterol at 230, triglyceride (H) 226, LDL (H) 126.  Results were faxed to Dr. Pindea.  Chemistry panel:  BUN 13, creatinine 0.6.  Liver function tests (N).    • 12/12/17 - DEXA scan showed osteopenia.   • 5/14/18 - CBC:  WBC 4.9, hemoglobin 11.8, platelet count 249,000.  Differentials are 53% neutrophils, 29% lymphocytes.  There was no monocytosis, eosinophilia or basophilia.  B12 level was low-normal at 200.  Ferritin 38.  Folate normal 21.  BUN 17.  Creatinine 0.7.    • 5/16/18 - Urinalysis showed trace blood, otherwise negative.  Urine culture did not grow any significant organisms.    • 5/16/18 - Patient was placed on iron sulfate 325 mg p.o. daily and B12 injections.  She was also given a referral to GI.    • 6/13/18 - Patient seen by Dr. Donnelly.  Hemoccult stools were checked.   • Urinalysis at the last visit showed small blood.  Urine culture was negative for infection.  Patient was referred to Dr. Otilio Tong who she saw on 11/16/18.  Dr. Tong recommended CT scans of the abdomen and pelvis and also  cystoscopy.    • 12/13/18 - Fasting lipid panel with total cholesterol of 197, triglyceride 124, HDL was 60, LDL was 112.  • 7/2019 Completed 10 years Femara treatment   • 12/27/2019- Patient had bone density which showed osteopenia.    Past Medical History:   Diagnosis Date   • Breast cancer (HCC)    • Fibromyalgia    • GERD (gastroesophageal reflux disease)    • Hypercholesteremia    • IBS (irritable bowel syndrome)    • Mitral valve prolapse    • Osteoarthritis    • Seasonal allergies        Past Surgical History:   Procedure Laterality Date   • APPENDECTOMY     • CATARACT EXTRACTION, BILATERAL     • GALLBLADDER SURGERY  10/27/2020    Dr. Lima    • GANGLION CYST EXCISION      wrist   • HYSTERECTOMY     • MASTECTOMY           Current Outpatient Medications:   •  diclofenac (VOLTAREN) 75 MG EC tablet, Take 75 mg by mouth 2 (Two) Times a Day., Disp: , Rfl: 4  •  dicyclomine (BENTYL) 20 MG tablet, , Disp: , Rfl:   •  dilTIAZem (TIAZAC) 240 MG 24 hr capsule, 240 mg Daily., Disp: , Rfl:   •  escitalopram (LEXAPRO) 10 MG tablet, Take 10 mg by mouth Daily., Disp: , Rfl: 4  •  montelukast (SINGULAIR) 10 MG tablet, Take 10 mg by mouth Daily., Disp: , Rfl: 3  •  omeprazole (priLOSEC) 40 MG capsule, TAKE 1 CAPSULE BY MOUTH ONCE DAILY FOR REFLUX, Disp: , Rfl: 12  •  pravastatin (PRAVACHOL) 80 MG tablet, Take 80 mg by mouth Daily., Disp: , Rfl: 4  •  sucralfate (CARAFATE) 1 g tablet, Take 1 g by mouth 3 (Three) Times a Day As Needed., Disp: , Rfl:     Allergies   Allergen Reactions   • Atenolol Unknown (See Comments)     Not known    • Codeine Unknown (See Comments)     Compounded drug    • Fiorinal [Butalbital-Aspirin-Caffeine] Unknown (See Comments)     Not known    • Morphine Unknown (See Comments)     Unknown    • Valium [Diazepam] Unknown (See Comments)     Not known        Family History   Problem Relation Age of Onset   • Breast cancer Paternal Aunt 75       Cancer-related family history includes Breast cancer (age  "of onset: 75) in her paternal aunt.    Social History     Tobacco Use   • Smoking status: Former     Types: Cigarettes   • Tobacco comments:     40 years ago    Substance Use Topics   • Alcohol use: No   • Drug use: No       I have reviewed and confirmed the accuracy of the patient's history: Chief complaint, HPI, ROS and Subjective as entered by the MA/LPN/RN. Elisa Jacob MD 12/06/22     SUBJECTIVE:    Patient denies any new issues today        REVIEW OF SYSTEMS:    Review of Systems   Constitutional: Positive for fatigue. Negative for chills and fever.   HENT: Negative for ear pain, mouth sores, nosebleeds and sore throat.    Eyes: Negative for photophobia and visual disturbance.        Wears glasses    Respiratory: Negative for wheezing and stridor.         Snoring at night    Cardiovascular: Negative for chest pain and palpitations.   Gastrointestinal: Negative for abdominal pain, diarrhea, nausea and vomiting.   Endocrine: Negative for cold intolerance and heat intolerance.   Genitourinary: Negative for dysuria and hematuria.   Musculoskeletal: Negative for joint swelling and neck stiffness.   Skin: Negative for color change and rash.   Neurological: Negative for dizziness, seizures, syncope and light-headedness.   Hematological: Negative for adenopathy.        No obvious bleeding   Psychiatric/Behavioral: Negative for agitation, confusion and hallucinations.   All other systems reviewed and are negative.    OBJECTIVE:    Vitals:    12/06/22 0907   BP: 128/83   Pulse: 92   Resp: 18   Temp: 96.9 °F (36.1 °C)   TempSrc: Infrared   Weight: 76.7 kg (169 lb)   Height: 162.6 cm (64\")   PainSc: 0-No pain       ECOG    (0) Fully active, able to carry on all predisease performance without restriction    Physical Exam   Constitutional: She is oriented to person, place, and time. No distress.   HENT:   Head: Normocephalic and atraumatic.   Eyes: Conjunctivae are normal. Right eye exhibits no discharge. Left eye " exhibits no discharge. No scleral icterus.   Neck: No thyromegaly present.   Cardiovascular: Normal rate, regular rhythm and normal heart sounds. Exam reveals no gallop and no friction rub.   Pulmonary/Chest: Effort normal. No stridor. No respiratory distress. She has no wheezes. She exhibits no mass, no tenderness, no edema and no swelling.   Bilateral mastectomy - chest wall exam was negative  Bilateral axillary exam was negative   Abdominal: Soft. Bowel sounds are normal. She exhibits no mass. There is no abdominal tenderness. There is no rebound and no guarding.   Musculoskeletal: Normal range of motion. No tenderness.   Lymphadenopathy:     She has no cervical adenopathy.   Neurological: She is alert and oriented to person, place, and time. She exhibits normal muscle tone.   Skin: Skin is warm. No rash noted. She is not diaphoretic. No erythema.   Psychiatric: Her behavior is normal.   Nursing note and vitals reviewed.    I have reexamined the patient and the results are consistent with the previously documented exam. Elisacatalina Jacob MD       RECENT LABS he has Dr. Jacob    WBC   Date Value Ref Range Status   12/06/2022 7.04 3.40 - 10.80 10*3/mm3 Final     RBC   Date Value Ref Range Status   12/06/2022 4.25 3.77 - 5.28 10*6/mm3 Final     Hemoglobin   Date Value Ref Range Status   12/06/2022 12.4 12.0 - 15.9 g/dL Final     Hematocrit   Date Value Ref Range Status   12/06/2022 37.9 34.0 - 46.6 % Final     MCV   Date Value Ref Range Status   12/06/2022 89.2 79.0 - 97.0 fL Final     MCH   Date Value Ref Range Status   12/06/2022 29.2 26.6 - 33.0 pg Final     MCHC   Date Value Ref Range Status   12/06/2022 32.7 31.5 - 35.7 g/dL Final     RDW   Date Value Ref Range Status   12/06/2022 12.6 12.3 - 15.4 % Final     RDW-SD   Date Value Ref Range Status   12/06/2022 40.7 37.0 - 54.0 fl Final     MPV   Date Value Ref Range Status   12/06/2022 9.3 6.0 - 12.0 fL Final     Platelets   Date Value Ref Range Status    12/06/2022 304 140 - 450 10*3/mm3 Final     Neutrophil %   Date Value Ref Range Status   12/06/2022 58.6 42.7 - 76.0 % Final     Lymphocyte %   Date Value Ref Range Status   12/06/2022 27.1 19.6 - 45.3 % Final     Monocyte %   Date Value Ref Range Status   12/06/2022 9.1 5.0 - 12.0 % Final     Eosinophil %   Date Value Ref Range Status   12/06/2022 4.3 0.3 - 6.2 % Final     Basophil %   Date Value Ref Range Status   12/06/2022 0.9 0.0 - 1.5 % Final     Neutrophils, Absolute   Date Value Ref Range Status   12/06/2022 4.13 1.70 - 7.00 10*3/mm3 Final     Lymphocytes, Absolute   Date Value Ref Range Status   12/06/2022 1.91 0.70 - 3.10 10*3/mm3 Final     Monocytes, Absolute   Date Value Ref Range Status   12/06/2022 0.64 0.10 - 0.90 10*3/mm3 Final     Eosinophils, Absolute   Date Value Ref Range Status   12/06/2022 0.30 0.00 - 0.40 10*3/mm3 Final     Basophils, Absolute   Date Value Ref Range Status   12/06/2022 0.06 0.00 - 0.20 10*3/mm3 Final       Lab Results   Component Value Date    GLUCOSE 83 12/06/2022    GLUCOSE 83 12/06/2022    BUN 17 12/06/2022    BUN 17 12/06/2022    CREATININE 0.65 12/06/2022    CREATININE 0.66 12/06/2022    EGFRIFNONA 82 12/07/2021    BCR 26.2 (H) 12/06/2022    BCR 25.8 (H) 12/06/2022    K 3.5 12/06/2022    K 3.5 12/06/2022    CO2 26.0 12/06/2022    CO2 26.0 12/06/2022    CALCIUM 9.1 12/06/2022    CALCIUM 9.2 12/06/2022    ALBUMIN 4.40 12/06/2022    ALBUMIN 4.20 12/06/2022    LABIL2 1.3 03/13/2019    AST 33 (H) 12/06/2022    AST 33 (H) 12/06/2022    ALT 38 (H) 12/06/2022    ALT 38 (H) 12/06/2022         ASSESSMENT:    1. Clinical stage III invasive ductal carcinoma involving the right breast status post right modified mastectomy followed by adjuvant chemotherapy with TAC for eight cycles, followed by adjuvant right chest wall and markie radiation and currently on Femara since November 2009.  So far patient does not have any clinical evidence of relapse disease.  Patient has d/c Femara in  7/2019.  Continue surveillance  2. Status post left prophylactic mastectomy with benign pathology.  Reviewed.  No chest wall abnormalities identified. Reviewed.  3. Anemia secondary to iron deficiency and B12 deficiency, she was on B12 injection.   4. Comprehensive genetic analysis with [facility] was essentially negative for any clinically significant mutation.   5. Elevated LFTs: Follow up with her PCP.  Patient is on statins.  6. Hypertriglyceridemia.  Primary care physician  7. Osteopenia, off Prolia now due to discontinuation of AI.   Continued on calcium + vitamin D.   8. Hematuria.  Work up by Dr. Otilio Tong with [facility].   9. Osteopenia last bone density was December 2021.  Continue calcium and vitamin D    PLANS:    1. Her exam was unremarkable  2. CMP today  3. Bone density test will be due  December 2023. Reviewed.  4. She will continue Os-Phillip D for osteopenia.  Patient is now off Prolia  5. Order prosthetic bras  6. Patient is off B12 injections at this time.  Check B12 level  7. Consider Evista if patient develops osteoporosis   8. Continue calcium vitamin D  9. Continue breast self examination   10. RTC in 6 months or earlier as needed       I have reviewed labs results, imaging, vitals, and medications with the patient today.  CBC is normal today and was reviewed with the patient.    Lab Results   Component Value Date    WBC 7.04 12/06/2022    HGB 12.4 12/06/2022    HCT 37.9 12/06/2022    MCV 89.2 12/06/2022     12/06/2022     Patient verbalized understanding and is in agreement of the above plan.      I spent 30 total minutes, face-to-face, caring for Tessa today.  90% of this time involved counseling and/or coordination of care as documented within this note.

## 2022-12-06 ENCOUNTER — LAB (OUTPATIENT)
Dept: LAB | Facility: HOSPITAL | Age: 72
End: 2022-12-06

## 2022-12-06 ENCOUNTER — OFFICE VISIT (OUTPATIENT)
Dept: ONCOLOGY | Facility: CLINIC | Age: 72
End: 2022-12-06

## 2022-12-06 VITALS
HEIGHT: 64 IN | BODY MASS INDEX: 28.85 KG/M2 | WEIGHT: 169 LBS | RESPIRATION RATE: 18 BRPM | DIASTOLIC BLOOD PRESSURE: 83 MMHG | TEMPERATURE: 96.9 F | HEART RATE: 92 BPM | SYSTOLIC BLOOD PRESSURE: 128 MMHG

## 2022-12-06 DIAGNOSIS — E53.8 B12 DEFICIENCY: ICD-10-CM

## 2022-12-06 DIAGNOSIS — R79.89 ELEVATED LIVER FUNCTION TESTS: ICD-10-CM

## 2022-12-06 DIAGNOSIS — C50.911 MALIGNANT NEOPLASM OF RIGHT FEMALE BREAST, UNSPECIFIED ESTROGEN RECEPTOR STATUS, UNSPECIFIED SITE OF BREAST: Primary | ICD-10-CM

## 2022-12-06 LAB
ALBUMIN SERPL-MCNC: 4.2 G/DL (ref 3.5–5.2)
ALBUMIN SERPL-MCNC: 4.4 G/DL (ref 3.5–5.2)
ALBUMIN/GLOB SERPL: 1.4 G/DL
ALBUMIN/GLOB SERPL: 1.6 G/DL
ALP SERPL-CCNC: 71 U/L (ref 39–117)
ALP SERPL-CCNC: 73 U/L (ref 39–117)
ALT SERPL W P-5'-P-CCNC: 38 U/L (ref 1–33)
ALT SERPL W P-5'-P-CCNC: 38 U/L (ref 1–33)
ANION GAP SERPL CALCULATED.3IONS-SCNC: 12 MMOL/L (ref 5–15)
ANION GAP SERPL CALCULATED.3IONS-SCNC: 12 MMOL/L (ref 5–15)
AST SERPL-CCNC: 33 U/L (ref 1–32)
AST SERPL-CCNC: 33 U/L (ref 1–32)
BASOPHILS # BLD AUTO: 0.06 10*3/MM3 (ref 0–0.2)
BASOPHILS NFR BLD AUTO: 0.9 % (ref 0–1.5)
BILIRUB SERPL-MCNC: 0.2 MG/DL (ref 0–1.2)
BILIRUB SERPL-MCNC: 0.2 MG/DL (ref 0–1.2)
BUN SERPL-MCNC: 17 MG/DL (ref 8–23)
BUN SERPL-MCNC: 17 MG/DL (ref 8–23)
BUN/CREAT SERPL: 25.8 (ref 7–25)
BUN/CREAT SERPL: 26.2 (ref 7–25)
CALCIUM SPEC-SCNC: 9.1 MG/DL (ref 8.6–10.5)
CALCIUM SPEC-SCNC: 9.2 MG/DL (ref 8.6–10.5)
CHLORIDE SERPL-SCNC: 102 MMOL/L (ref 98–107)
CHLORIDE SERPL-SCNC: 102 MMOL/L (ref 98–107)
CO2 SERPL-SCNC: 26 MMOL/L (ref 22–29)
CO2 SERPL-SCNC: 26 MMOL/L (ref 22–29)
CREAT SERPL-MCNC: 0.65 MG/DL (ref 0.57–1)
CREAT SERPL-MCNC: 0.66 MG/DL (ref 0.57–1)
DEPRECATED RDW RBC AUTO: 40.7 FL (ref 37–54)
EGFRCR SERPLBLD CKD-EPI 2021: 94.5 ML/MIN/1.73
EGFRCR SERPLBLD CKD-EPI 2021: 94.9 ML/MIN/1.73
EOSINOPHIL # BLD AUTO: 0.3 10*3/MM3 (ref 0–0.4)
EOSINOPHIL NFR BLD AUTO: 4.3 % (ref 0.3–6.2)
ERYTHROCYTE [DISTWIDTH] IN BLOOD BY AUTOMATED COUNT: 12.6 % (ref 12.3–15.4)
GLOBULIN UR ELPH-MCNC: 2.8 GM/DL
GLOBULIN UR ELPH-MCNC: 3 GM/DL
GLUCOSE SERPL-MCNC: 83 MG/DL (ref 65–99)
GLUCOSE SERPL-MCNC: 83 MG/DL (ref 65–99)
HCT VFR BLD AUTO: 37.9 % (ref 34–46.6)
HGB BLD-MCNC: 12.4 G/DL (ref 12–15.9)
HOLD SPECIMEN: NORMAL
LYMPHOCYTES # BLD AUTO: 1.91 10*3/MM3 (ref 0.7–3.1)
LYMPHOCYTES NFR BLD AUTO: 27.1 % (ref 19.6–45.3)
MCH RBC QN AUTO: 29.2 PG (ref 26.6–33)
MCHC RBC AUTO-ENTMCNC: 32.7 G/DL (ref 31.5–35.7)
MCV RBC AUTO: 89.2 FL (ref 79–97)
MONOCYTES # BLD AUTO: 0.64 10*3/MM3 (ref 0.1–0.9)
MONOCYTES NFR BLD AUTO: 9.1 % (ref 5–12)
NEUTROPHILS NFR BLD AUTO: 4.13 10*3/MM3 (ref 1.7–7)
NEUTROPHILS NFR BLD AUTO: 58.6 % (ref 42.7–76)
PLATELET # BLD AUTO: 304 10*3/MM3 (ref 140–450)
PMV BLD AUTO: 9.3 FL (ref 6–12)
POTASSIUM SERPL-SCNC: 3.5 MMOL/L (ref 3.5–5.2)
POTASSIUM SERPL-SCNC: 3.5 MMOL/L (ref 3.5–5.2)
PROT SERPL-MCNC: 7.2 G/DL (ref 6–8.5)
PROT SERPL-MCNC: 7.2 G/DL (ref 6–8.5)
RBC # BLD AUTO: 4.25 10*6/MM3 (ref 3.77–5.28)
SODIUM SERPL-SCNC: 140 MMOL/L (ref 136–145)
SODIUM SERPL-SCNC: 140 MMOL/L (ref 136–145)
VIT B12 BLD-MCNC: 1477 PG/ML (ref 211–946)
VIT B12 BLD-MCNC: 1499 PG/ML (ref 211–946)
WBC NRBC COR # BLD: 7.04 10*3/MM3 (ref 3.4–10.8)

## 2022-12-06 PROCEDURE — 80053 COMPREHEN METABOLIC PANEL: CPT

## 2022-12-06 PROCEDURE — 82607 VITAMIN B-12: CPT | Performed by: INTERNAL MEDICINE

## 2022-12-06 PROCEDURE — 36415 COLL VENOUS BLD VENIPUNCTURE: CPT

## 2022-12-06 PROCEDURE — 85025 COMPLETE CBC W/AUTO DIFF WBC: CPT

## 2022-12-06 PROCEDURE — 99214 OFFICE O/P EST MOD 30 MIN: CPT | Performed by: INTERNAL MEDICINE

## 2022-12-08 LAB — METHYLMALONATE SERPL-SCNC: 115 NMOL/L (ref 0–378)

## 2022-12-12 ENCOUNTER — TELEPHONE (OUTPATIENT)
Dept: ONCOLOGY | Facility: CLINIC | Age: 72
End: 2022-12-12

## 2022-12-12 NOTE — TELEPHONE ENCOUNTER
Caller: Tessa Kilpatrick    Relationship: Self    Best call back number: [phone]      What was the call regarding: PT CALLED TO GET HER LAB RESULTS     Do you require a callback: YES

## 2022-12-12 NOTE — TELEPHONE ENCOUNTER
Reviewed labs with pt. B12 level elevated. I told her I would discuss it with the NP and see if she should discontinue her oral B12 supplements. Pt verbalized understanding.     Spoke to VICENTE Peters and she stated that the pt could discontinue her oral B12 until her next appt. Called pt back and relayed this to her. She verbalized understanding. No further questions or needs at this time.

## 2023-06-08 NOTE — PROGRESS NOTES
Hematology/Oncology Outpatient Follow Up    PATIENT NAME:Tessa Kilpatrick  :1950  MRN: [id number]  PRIMARY CARE PHYSICIAN: Sharona Pineda MD  REFERRING PHYSICIAN: No ref. provider found    Chief Complaint   Patient presents with    Follow-up     6 month follow up  Malignant neoplasm of right female breast, unspecified estrogen receptor status, unspecified site of breast        HISTORY OF PRESENT ILLNESS:     This is a 72 y.o.female who in  was diagnosed with right breast cancer.  At the time patient was diagnosed with clinical stage III disease and underwent right modified mastectomy for invasive ductal carcinoma measuring 4.2 cm in size.  Seven out of nine lymph nodes were positive for metastatic disease.  Additional lymph node dissection revealed 6 out of 11 lymph nodes were positive for metastatic disease.  Her pathology stage was T2N3Mx.  Her right breast cancer was positive for estrogen receptor and negative for progesterone receptor, Ki-67 was positive at 36%.  HER-2/nickie by FISH was not amplified.  In 2009 patient received adjuvant chemotherapy with TAC which is combination of Adriamycin, Cytoxan and Taxotere for a total of eight cycles initiated in 3/26/09 and completed on 09.  This was subsequently followed by adjuvant radiation treatment and currently patient is on Femara since 2009.  She developed abnormal enhancement on her imaging studies and for that reason patient underwent prophylactic left mastectomy.  So far patient has tolerated Femara fairly well.  She was prescribed Arimidex briefly but due to intolerance this was discontinued.  Patient said she had her last bone density test approximately 18 months ago and this was within normal limits.  Patient has since them moved to this area and she wants to establish with medical oncology for continuity of care.    2009 - Patient underwent left simple mastectomy.  Pathology revealed benign breast tissue with  fibrocystic changes and fibroadenoma.    4/22/15 - WBC 6.9, hemoglobin 12.1, platelet count 231,000.  CMP showed normal liver function tests.  BUN 11.  Creatinine 0.6.   10/27/15 - Total cholesterol of 190,  (normal < 100), liver function tests (N).  11/3/15 - Anemia work up:  reticulocyte count (N) 0.91, B12 255, ferritin 78 (N), folate > 24, haptoglobin elevated to 207, LDH (N) 169, TIBC 404, serum iron (N) 55, iron saturation 14.  SPEP with immunofixation assay was negative for monoclonal gammopathy.    2/2/16 - Serum transferrin assay (N) 2.9, methylmalonic acid (N) 170.    EGD and colonoscopy done revealed normal EGD and normal colonoscopy.  Follow up in 10 years was recommended for repeat colonoscopy.   3/24/16 - Comprehensive genetic analysis with [facility] which was essentially negative for any clinically significant mutation.    10/8/16 - Fasting lipid profile which showed a high cholesterol at 230, triglyceride (H) 226, LDL (H) 126.  Results were faxed to Dr. Pineda.  Chemistry panel:  BUN 13, creatinine 0.6.  Liver function tests (N).    12/12/17 - DEXA scan showed osteopenia.   5/14/18 - CBC:  WBC 4.9, hemoglobin 11.8, platelet count 249,000.  Differentials are 53% neutrophils, 29% lymphocytes.  There was no monocytosis, eosinophilia or basophilia.  B12 level was low-normal at 200.  Ferritin 38.  Folate normal 21.  BUN 17.  Creatinine 0.7.    5/16/18 - Urinalysis showed trace blood, otherwise negative.  Urine culture did not grow any significant organisms.    5/16/18 - Patient was placed on iron sulfate 325 mg p.o. daily and B12 injections.  She was also given a referral to GI.    6/13/18 - Patient seen by Dr. Donnelly.  Hemoccult stools were checked.   Urinalysis at the last visit showed small blood.  Urine culture was negative for infection.  Patient was referred to Dr. Otilio Tong who she saw on 11/16/18.  Dr. Tong recommended CT scans of the abdomen and pelvis and also cystoscopy.    12/13/18 -  Fasting lipid panel with total cholesterol of 197, triglyceride 124, HDL was 60, LDL was 112.  7/2019 Completed 10 years Femara treatment   12/27/2019- Patient had bone density which showed osteopenia.    Past Medical History:   Diagnosis Date    Breast cancer     Fibromyalgia     GERD (gastroesophageal reflux disease)     Hypercholesteremia     IBS (irritable bowel syndrome)     Mitral valve prolapse     Osteoarthritis     Seasonal allergies        Past Surgical History:   Procedure Laterality Date    APPENDECTOMY      CATARACT EXTRACTION, BILATERAL      GALLBLADDER SURGERY  10/27/2020    Dr. Lima     GANGLION CYST EXCISION      wrist    HYSTERECTOMY      MASTECTOMY           Current Outpatient Medications:     diclofenac (VOLTAREN) 75 MG EC tablet, Take 1 tablet by mouth 2 (Two) Times a Day., Disp: , Rfl: 4    dicyclomine (BENTYL) 20 MG tablet, , Disp: , Rfl:     dilTIAZem (TIAZAC) 240 MG 24 hr capsule, 1 capsule Daily., Disp: , Rfl:     escitalopram (LEXAPRO) 10 MG tablet, Take 1 tablet by mouth Daily., Disp: , Rfl: 4    levocetirizine (XYZAL) 5 MG tablet, Take 1 tablet by mouth Every Evening., Disp: , Rfl:     montelukast (SINGULAIR) 10 MG tablet, Take 1 tablet by mouth Daily., Disp: , Rfl: 3    omeprazole (priLOSEC) 40 MG capsule, TAKE 1 CAPSULE BY MOUTH ONCE DAILY FOR REFLUX, Disp: , Rfl: 12    pravastatin (PRAVACHOL) 80 MG tablet, Take 1 tablet by mouth Daily., Disp: , Rfl: 4    sucralfate (CARAFATE) 1 g tablet, Take 1 tablet by mouth 3 (Three) Times a Day As Needed., Disp: , Rfl:     cetirizine (zyrTEC) 10 MG tablet, Take 1 tablet by mouth Daily. (Patient not taking: Reported on 6/13/2023), Disp: , Rfl:     famotidine (PEPCID) 20 MG tablet, Take 2 tablets by mouth At Night As Needed. (Patient not taking: Reported on 6/13/2023), Disp: , Rfl:     Allergies   Allergen Reactions    Atenolol Unknown (See Comments)     Not known     Codeine Unknown (See Comments)     Compounded drug     Fiorinal  [Butalbital-Aspirin-Caffeine] Unknown (See Comments)     Not known     Morphine Unknown (See Comments)     Unknown     Valium [Diazepam] Unknown (See Comments)     Not known        Family History   Problem Relation Age of Onset    Breast cancer Paternal Aunt 75       Cancer-related family history includes Breast cancer (age of onset: 75) in her paternal aunt.    Social History     Tobacco Use    Smoking status: Former     Types: Cigarettes    Tobacco comments:     40 years ago    Substance Use Topics    Alcohol use: No    Drug use: No       I have reviewed and confirmed the accuracy of the patient's history: Chief complaint, HPI, ROS and Subjective as entered by the MA/LPN/RN. Lorraine Paige Seth, APRN 06/13/23     SUBJECTIVE:  Tessa is here for her routine follow-up.  She reports that she is doing well.  She does have some fatigue.  She is compliant with monthly bilateral axilla and chest wall exam and reports no new changes or findings.  She also is compliant with her calcium and vitamin D supplementation.        REVIEW OF SYSTEMS:    Review of Systems   Constitutional:  Positive for fatigue. Negative for chills and fever.   HENT:  Negative for ear pain, mouth sores, nosebleeds and sore throat.    Eyes:  Negative for photophobia and visual disturbance.        Wears glasses    Respiratory:  Negative for wheezing and stridor.         Snoring at night    Cardiovascular:  Negative for chest pain and palpitations.   Gastrointestinal:  Negative for abdominal pain, diarrhea, nausea and vomiting.   Endocrine: Negative for cold intolerance and heat intolerance.   Genitourinary:  Negative for dysuria and hematuria.   Musculoskeletal:  Negative for joint swelling and neck stiffness.   Skin:  Negative for color change and rash.   Neurological:  Negative for dizziness, seizures, syncope and light-headedness.   Hematological:  Negative for adenopathy.        No obvious bleeding   Psychiatric/Behavioral:  Negative for  "agitation, confusion and hallucinations.    All other systems reviewed and are negative.  OBJECTIVE:    Vitals:    06/13/23 0918   BP: 115/78   Pulse: 87   SpO2: 97%   Weight: 77.5 kg (170 lb 12.8 oz)   Height: 162.6 cm (64\")   PainSc: 0-No pain       ECOG    (0) Fully active, able to carry on all predisease performance without restriction    Physical Exam   Constitutional: She is oriented to person, place, and time. No distress.   HENT:   Head: Normocephalic and atraumatic.   Eyes: Conjunctivae are normal. Right eye exhibits no discharge. Left eye exhibits no discharge. No scleral icterus.   Neck: No thyromegaly present.   Cardiovascular: Normal rate, regular rhythm and normal heart sounds. Exam reveals no gallop and no friction rub.   Pulmonary/Chest: Effort normal. No stridor. No respiratory distress. She has no wheezes. She exhibits no mass, no tenderness, no edema and no swelling.   Bilateral mastectomy - chest wall exam was negative  Bilateral axillary exam was negative   Abdominal: Soft. Bowel sounds are normal. She exhibits no mass. There is no abdominal tenderness. There is no rebound and no guarding.   Musculoskeletal: Normal range of motion. No tenderness.   Lymphadenopathy:     She has no cervical adenopathy.   Neurological: She is alert and oriented to person, place, and time. She exhibits normal muscle tone.   Skin: Skin is warm. No rash noted. She is not diaphoretic. No erythema.   Psychiatric: Her behavior is normal.   Nursing note and vitals reviewed.  I have reexamined the patient and the results are consistent with the previously documented exam. VICENTE Kolb       RECENT LABS he has Dr. Jacob    WBC   Date Value Ref Range Status   06/13/2023 6.41 3.40 - 10.80 10*3/mm3 Final     RBC   Date Value Ref Range Status   06/13/2023 4.47 3.77 - 5.28 10*6/mm3 Final     Hemoglobin   Date Value Ref Range Status   06/13/2023 12.8 12.0 - 15.9 g/dL Final     Hematocrit   Date Value Ref Range Status "   06/13/2023 40.1 34.0 - 46.6 % Final     MCV   Date Value Ref Range Status   06/13/2023 89.7 79.0 - 97.0 fL Final     MCH   Date Value Ref Range Status   06/13/2023 28.6 26.6 - 33.0 pg Final     MCHC   Date Value Ref Range Status   06/13/2023 31.9 31.5 - 35.7 g/dL Final     RDW   Date Value Ref Range Status   06/13/2023 12.7 12.3 - 15.4 % Final     RDW-SD   Date Value Ref Range Status   06/13/2023 40.9 37.0 - 54.0 fl Final     MPV   Date Value Ref Range Status   06/13/2023 8.9 6.0 - 12.0 fL Final     Platelets   Date Value Ref Range Status   06/13/2023 302 140 - 450 10*3/mm3 Final     Neutrophil %   Date Value Ref Range Status   06/13/2023 50.6 42.7 - 76.0 % Final     Lymphocyte %   Date Value Ref Range Status   06/13/2023 33.2 19.6 - 45.3 % Final     Monocyte %   Date Value Ref Range Status   06/13/2023 9.4 5.0 - 12.0 % Final     Eosinophil %   Date Value Ref Range Status   06/13/2023 5.9 0.3 - 6.2 % Final     Basophil %   Date Value Ref Range Status   06/13/2023 0.9 0.0 - 1.5 % Final     Neutrophils, Absolute   Date Value Ref Range Status   06/13/2023 3.24 1.70 - 7.00 10*3/mm3 Final     Lymphocytes, Absolute   Date Value Ref Range Status   06/13/2023 2.13 0.70 - 3.10 10*3/mm3 Final     Monocytes, Absolute   Date Value Ref Range Status   06/13/2023 0.60 0.10 - 0.90 10*3/mm3 Final     Eosinophils, Absolute   Date Value Ref Range Status   06/13/2023 0.38 0.00 - 0.40 10*3/mm3 Final     Basophils, Absolute   Date Value Ref Range Status   06/13/2023 0.06 0.00 - 0.20 10*3/mm3 Final       Lab Results   Component Value Date    GLUCOSE 83 12/06/2022    GLUCOSE 83 12/06/2022    BUN 17 12/06/2022    BUN 17 12/06/2022    CREATININE 0.65 12/06/2022    CREATININE 0.66 12/06/2022    EGFRIFNONA 82 12/07/2021    BCR 26.2 (H) 12/06/2022    BCR 25.8 (H) 12/06/2022    K 3.5 12/06/2022    K 3.5 12/06/2022    CO2 26.0 12/06/2022    CO2 26.0 12/06/2022    CALCIUM 9.1 12/06/2022    CALCIUM 9.2 12/06/2022    ALBUMIN 4.40 12/06/2022     ALBUMIN 4.20 12/06/2022    LABIL2 1.3 03/13/2019    AST 33 (H) 12/06/2022    AST 33 (H) 12/06/2022    ALT 38 (H) 12/06/2022    ALT 38 (H) 12/06/2022         ASSESSMENT:    Clinical stage III invasive ductal carcinoma involving the right breast status post right modified mastectomy followed by adjuvant chemotherapy with TAC for eight cycles, followed by adjuvant right chest wall and markie radiation and currently on Femara since November 2009.  So far patient does not have any clinical evidence of relapse disease.  Patient has d/c Femara in 7/2019.  Continue surveillance  Status post left prophylactic mastectomy with benign pathology.  Reviewed.  No chest wall abnormalities identified. Reviewed.  Anemia secondary to iron deficiency and B12 deficiency, she was on B12 injection.  Resolved.  Comprehensive genetic analysis with [facility] was essentially negative for any clinically significant mutation.   Elevated LFTs: Follow up with her PCP.  Patient is on statins.  Hypertriglyceridemia.  Primary care physician  Osteopenia, off Prolia now due to discontinuation of AI.   Continued on calcium + vitamin D.   Hematuria.  Work up by Dr. Otilio Tong with [facility].   Osteopenia last bone density was December 2021.  Continue calcium and vitamin D    PLANS:    Her exam was unremarkable  CBC today reviewed with patient showing normal hemoglobin  Advised follow-up with her PCP for her continued fatigue to evaluate for etiologies outside of anemia.  CMP today  Bone density test will be due  December 2023.  Ordered today.  She will continue Os-Phillip D for osteopenia.  Patient is now off Prolia  Order prosthetic bras.  This has been completed.  Patient is off B12 injections at this time.  Reviewed prior level showing no deficiency.  Consider Evista if patient develops osteoporosis.  Continue monthly bilateral axilla and chest wall self-exam  RTC in 6 months with Dr. Jacob or earlier as needed       I have reviewed labs results,  imaging, vitals, and medications with the patient today.  CBC is normal today and was reviewed with the patient.    Lab Results   Component Value Date    WBC 6.41 06/13/2023    HGB 12.8 06/13/2023    HCT 40.1 06/13/2023    MCV 89.7 06/13/2023     06/13/2023     Patient verbalized understanding and is in agreement of the above plan.      I spent 30 total minutes, face-to-face, caring for Tessa today.  90% of this time involved counseling and/or coordination of care as documented within this note.

## 2023-06-13 ENCOUNTER — APPOINTMENT (OUTPATIENT)
Dept: LAB | Facility: HOSPITAL | Age: 73
End: 2023-06-13
Payer: MEDICARE

## 2023-06-13 ENCOUNTER — OFFICE VISIT (OUTPATIENT)
Dept: ONCOLOGY | Facility: CLINIC | Age: 73
End: 2023-06-13
Payer: MEDICARE

## 2023-06-13 VITALS
BODY MASS INDEX: 29.16 KG/M2 | HEIGHT: 64 IN | SYSTOLIC BLOOD PRESSURE: 115 MMHG | HEART RATE: 87 BPM | DIASTOLIC BLOOD PRESSURE: 78 MMHG | WEIGHT: 170.8 LBS | OXYGEN SATURATION: 97 %

## 2023-06-13 DIAGNOSIS — C50.911 MALIGNANT NEOPLASM OF RIGHT FEMALE BREAST, UNSPECIFIED ESTROGEN RECEPTOR STATUS, UNSPECIFIED SITE OF BREAST: Primary | ICD-10-CM

## 2023-06-13 DIAGNOSIS — Z78.0 POST-MENOPAUSAL: ICD-10-CM

## 2023-06-13 LAB
ALBUMIN SERPL-MCNC: 4.4 G/DL (ref 3.5–5.2)
ALBUMIN/GLOB SERPL: 1.5 G/DL
ALP SERPL-CCNC: 72 U/L (ref 39–117)
ALT SERPL W P-5'-P-CCNC: 34 U/L (ref 1–33)
ANION GAP SERPL CALCULATED.3IONS-SCNC: 8 MMOL/L (ref 5–15)
AST SERPL-CCNC: 34 U/L (ref 1–32)
BASOPHILS # BLD AUTO: 0.06 10*3/MM3 (ref 0–0.2)
BASOPHILS NFR BLD AUTO: 0.9 % (ref 0–1.5)
BILIRUB SERPL-MCNC: 0.3 MG/DL (ref 0–1.2)
BUN SERPL-MCNC: 16 MG/DL (ref 8–23)
BUN/CREAT SERPL: 18.8 (ref 7–25)
CALCIUM SPEC-SCNC: 9.8 MG/DL (ref 8.6–10.5)
CHLORIDE SERPL-SCNC: 104 MMOL/L (ref 98–107)
CO2 SERPL-SCNC: 29 MMOL/L (ref 22–29)
CREAT SERPL-MCNC: 0.85 MG/DL (ref 0.57–1)
DEPRECATED RDW RBC AUTO: 40.9 FL (ref 37–54)
EGFRCR SERPLBLD CKD-EPI 2021: 73.8 ML/MIN/1.73
EOSINOPHIL # BLD AUTO: 0.38 10*3/MM3 (ref 0–0.4)
EOSINOPHIL NFR BLD AUTO: 5.9 % (ref 0.3–6.2)
ERYTHROCYTE [DISTWIDTH] IN BLOOD BY AUTOMATED COUNT: 12.7 % (ref 12.3–15.4)
GLOBULIN UR ELPH-MCNC: 3 GM/DL
GLUCOSE SERPL-MCNC: 84 MG/DL (ref 65–99)
HCT VFR BLD AUTO: 40.1 % (ref 34–46.6)
HGB BLD-MCNC: 12.8 G/DL (ref 12–15.9)
HOLD SPECIMEN: NORMAL
HOLD SPECIMEN: NORMAL
LYMPHOCYTES # BLD AUTO: 2.13 10*3/MM3 (ref 0.7–3.1)
LYMPHOCYTES NFR BLD AUTO: 33.2 % (ref 19.6–45.3)
MCH RBC QN AUTO: 28.6 PG (ref 26.6–33)
MCHC RBC AUTO-ENTMCNC: 31.9 G/DL (ref 31.5–35.7)
MCV RBC AUTO: 89.7 FL (ref 79–97)
MONOCYTES # BLD AUTO: 0.6 10*3/MM3 (ref 0.1–0.9)
MONOCYTES NFR BLD AUTO: 9.4 % (ref 5–12)
NEUTROPHILS NFR BLD AUTO: 3.24 10*3/MM3 (ref 1.7–7)
NEUTROPHILS NFR BLD AUTO: 50.6 % (ref 42.7–76)
PLATELET # BLD AUTO: 302 10*3/MM3 (ref 140–450)
PMV BLD AUTO: 8.9 FL (ref 6–12)
POTASSIUM SERPL-SCNC: 4 MMOL/L (ref 3.5–5.2)
PROT SERPL-MCNC: 7.4 G/DL (ref 6–8.5)
RBC # BLD AUTO: 4.47 10*6/MM3 (ref 3.77–5.28)
SODIUM SERPL-SCNC: 141 MMOL/L (ref 136–145)
WBC NRBC COR # BLD: 6.41 10*3/MM3 (ref 3.4–10.8)

## 2023-06-13 PROCEDURE — 80053 COMPREHEN METABOLIC PANEL: CPT | Performed by: NURSE PRACTITIONER

## 2023-06-13 PROCEDURE — 36415 COLL VENOUS BLD VENIPUNCTURE: CPT

## 2023-06-13 PROCEDURE — 85025 COMPLETE CBC W/AUTO DIFF WBC: CPT | Performed by: NURSE PRACTITIONER

## 2023-06-13 RX ORDER — FAMOTIDINE 20 MG/1
40 TABLET, FILM COATED ORAL NIGHTLY PRN
COMMUNITY
Start: 2023-03-06

## 2023-06-13 RX ORDER — LEVOCETIRIZINE DIHYDROCHLORIDE 5 MG/1
5 TABLET, FILM COATED ORAL EVERY EVENING
COMMUNITY
Start: 2023-04-02

## 2023-06-13 RX ORDER — CETIRIZINE HYDROCHLORIDE 10 MG/1
10 TABLET ORAL DAILY
COMMUNITY
Start: 2023-03-06

## 2023-12-04 NOTE — PROGRESS NOTES
Hematology/Oncology Outpatient Follow Up    PATIENT NAME:Tessa Kilpatrick  :1950  MRN: [id number]  PRIMARY CARE PHYSICIAN: Sharona Pineda MD  REFERRING PHYSICIAN: No ref. provider found    Chief Complaint   Patient presents with    Follow-up     Malignant neoplasm of right female breast, unspecified estrogen receptor status, unspecified site of breast        HISTORY OF PRESENT ILLNESS:     This is a 73 y.o.female who in  was diagnosed with right breast cancer.  At the time patient was diagnosed with clinical stage III disease and underwent right modified mastectomy for invasive ductal carcinoma measuring 4.2 cm in size.  Seven out of nine lymph nodes were positive for metastatic disease.  Additional lymph node dissection revealed 6 out of 11 lymph nodes were positive for metastatic disease.  Her pathology stage was [id number].  Her right breast cancer was positive for estrogen receptor and negative for progesterone receptor, Ki-67 was positive at 36%.  HER-2/nickie by FISH was not amplified.  In 2009 patient received adjuvant chemotherapy with TAC which is combination of Adriamycin, Cytoxan and Taxotere for a total of eight cycles initiated in 3/26/09 and completed on 09.  This was subsequently followed by adjuvant radiation treatment and currently patient is on Femara since 2009.  She developed abnormal enhancement on her imaging studies and for that reason patient underwent prophylactic left mastectomy.  So far patient has tolerated Femara fairly well.  She was prescribed Arimidex briefly but due to intolerance this was discontinued.  Patient said she had her last bone density test approximately 18 months ago and this was within normal limits.  Patient has since them moved to this area and she wants to establish with medical oncology for continuity of care.    2009 - Patient underwent left simple mastectomy.  Pathology revealed benign breast tissue with fibrocystic  changes and fibroadenoma.    4/22/15 - WBC 6.9, hemoglobin 12.1, platelet count 231,000.  CMP showed normal liver function tests.  BUN 11.  Creatinine 0.6.   10/27/15 - Total cholesterol of 190,  (normal < 100), liver function tests (N).  11/3/15 - Anemia work up:  reticulocyte count (N) 0.91, B12 255, ferritin 78 (N), folate > 24, haptoglobin elevated to 207, LDH (N) 169, TIBC 404, serum iron (N) 55, iron saturation 14.  SPEP with immunofixation assay was negative for monoclonal gammopathy.    2/2/16 - Serum transferrin assay (N) 2.9, methylmalonic acid (N) 170.    EGD and colonoscopy done revealed normal EGD and normal colonoscopy.  Follow up in 10 years was recommended for repeat colonoscopy.   3/24/16 - Comprehensive genetic analysis with [employer] which was essentially negative for any clinically significant mutation.    10/8/16 - Fasting lipid profile which showed a high cholesterol at 230, triglyceride (H) 226, LDL (H) 126.  Results were faxed to Dr. Pineda.  Chemistry panel:  BUN 13, creatinine 0.6.  Liver function tests (N).    12/12/17 - DEXA scan showed osteopenia.   5/14/18 - CBC:  WBC 4.9, hemoglobin 11.8, platelet count 249,000.  Differentials are 53% neutrophils, 29% lymphocytes.  There was no monocytosis, eosinophilia or basophilia.  B12 level was low-normal at 200.  Ferritin 38.  Folate normal 21.  BUN 17.  Creatinine 0.7.    5/16/18 - Urinalysis showed trace blood, otherwise negative.  Urine culture did not grow any significant organisms.    5/16/18 - Patient was placed on iron sulfate 325 mg p.o. daily and B12 injections.  She was also given a referral to GI.    6/13/18 - Patient seen by Dr. Donnelly.  Hemoccult stools were checked.   Urinalysis at the last visit showed small blood.  Urine culture was negative for infection.  Patient was referred to Dr. Otilio Tong who she saw on 11/16/18.  Dr. Tong recommended CT scans of the abdomen and pelvis and also cystoscopy.    12/13/18 - Fasting lipid  panel with total cholesterol of 197, triglyceride 124, HDL was 60, LDL was 112.  7/2019 Completed 10 years Femara treatment   12/27/2019- Patient had bone density which showed osteopenia.    Past Medical History:   Diagnosis Date    Breast cancer     Fibromyalgia     GERD (gastroesophageal reflux disease)     Hypercholesteremia     IBS (irritable bowel syndrome)     Mitral valve prolapse     Osteoarthritis     Seasonal allergies        Past Surgical History:   Procedure Laterality Date    APPENDECTOMY      CATARACT EXTRACTION, BILATERAL      GALLBLADDER SURGERY  10/27/2020    Dr. Lima     GANGLION CYST EXCISION      wrist    HYSTERECTOMY      MASTECTOMY           Current Outpatient Medications:     hyoscyamine (LEVSIN) 0.125 MG SL tablet, DISSOLVE 1 TABLET UNDER TONGUE EVERY 4 HOURS AS NEEDED FOR STOMACH CRAMPS, Disp: , Rfl:     cetirizine (zyrTEC) 10 MG tablet, Take 1 tablet by mouth Daily. (Patient not taking: Reported on 6/13/2023), Disp: , Rfl:     diclofenac (VOLTAREN) 75 MG EC tablet, Take 1 tablet by mouth 2 (Two) Times a Day., Disp: , Rfl: 4    dicyclomine (BENTYL) 20 MG tablet, , Disp: , Rfl:     dilTIAZem (TIAZAC) 240 MG 24 hr capsule, 1 capsule Daily., Disp: , Rfl:     escitalopram (LEXAPRO) 10 MG tablet, Take 1 tablet by mouth Daily., Disp: , Rfl: 4    levocetirizine (XYZAL) 5 MG tablet, Take 1 tablet by mouth Every Evening., Disp: , Rfl:     montelukast (SINGULAIR) 10 MG tablet, Take 1 tablet by mouth Daily., Disp: , Rfl: 3    omeprazole (priLOSEC) 40 MG capsule, TAKE 1 CAPSULE BY MOUTH ONCE DAILY FOR REFLUX, Disp: , Rfl: 12    pravastatin (PRAVACHOL) 80 MG tablet, Take 1 tablet by mouth Daily., Disp: , Rfl: 4    Allergies   Allergen Reactions    Atenolol Unknown (See Comments)     Not known     Codeine Unknown (See Comments)     Compounded drug     Fiorinal [Butalbital-Aspirin-Caffeine] Unknown (See Comments)     Not known     Morphine Unknown (See Comments)     Unknown     Valium [Diazepam]  "Unknown (See Comments)     Not known        Family History   Problem Relation Age of Onset    Breast cancer Paternal Aunt 75       Cancer-related family history includes Breast cancer (age of onset: 75) in her paternal aunt.    Social History     Tobacco Use    Smoking status: Former     Types: Cigarettes    Tobacco comments:     40 years ago    Substance Use Topics    Alcohol use: No    Drug use: No     I have reviewed and confirmed the accuracy of the patient's history: Chief complaint, HPI, ROS, and Subjective as entered by the MA/LPN/RN. Elisa Telma Jacob MD 12/05/23        SUBJECTIVE:    Denies any new issues today.  She is here for her routine visit and has no new diagnoses since her last visit        REVIEW OF SYSTEMS:    Review of Systems   Constitutional:  Positive for fatigue. Negative for chills and fever.   HENT:  Negative for ear pain, mouth sores, nosebleeds and sore throat.    Eyes:  Negative for photophobia and visual disturbance.        Wears glasses    Respiratory:  Negative for wheezing and stridor.         Snoring at night    Cardiovascular:  Negative for chest pain and palpitations.   Gastrointestinal:  Negative for abdominal pain, diarrhea, nausea and vomiting.   Endocrine: Negative for cold intolerance and heat intolerance.   Genitourinary:  Negative for dysuria and hematuria.   Musculoskeletal:  Negative for joint swelling and neck stiffness.   Skin:  Negative for color change and rash.   Neurological:  Negative for dizziness, seizures, syncope and light-headedness.   Hematological:  Negative for adenopathy.        No obvious bleeding   Psychiatric/Behavioral:  Negative for agitation, confusion and hallucinations.    All other systems reviewed and are negative.    OBJECTIVE:    Vitals:    12/05/23 0911   BP: 119/80   Pulse: 96   Resp: 18   Temp: 98 °F (36.7 °C)   TempSrc: Infrared   SpO2: 96%   Weight: 78.5 kg (173 lb)   Height: 162.6 cm (64\")   PainSc: 0-No pain         ECOG    (0) " Fully active, able to carry on all predisease performance without restriction    Physical Exam   Constitutional: She is oriented to person, place, and time. No distress.   HENT:   Head: Normocephalic and atraumatic.   Eyes: Conjunctivae are normal. Right eye exhibits no discharge. Left eye exhibits no discharge. No scleral icterus.   Neck: No thyromegaly present.   Cardiovascular: Normal rate, regular rhythm and normal heart sounds. Exam reveals no gallop and no friction rub.   Pulmonary/Chest: Effort normal. No stridor. No respiratory distress. She has no wheezes. She exhibits no mass, no tenderness, no edema and no swelling.   Bilateral mastectomy - chest wall exam was negative  Bilateral axillary exam was negative   Abdominal: Soft. Bowel sounds are normal. She exhibits no mass. There is no abdominal tenderness. There is no rebound and no guarding.   Musculoskeletal: Normal range of motion. No tenderness.   Lymphadenopathy:     She has no cervical adenopathy.   Neurological: She is alert and oriented to person, place, and time. She exhibits normal muscle tone.   Skin: Skin is warm. No rash noted. She is not diaphoretic. No erythema.   Psychiatric: Her behavior is normal.   Nursing note and vitals reviewed.    I have reexamined the patient and the results are consistent with the previously documented exam. Elisa Jacob MD      RECENT LABS     WBC   Date Value Ref Range Status   12/05/2023 7.09 3.40 - 10.80 10*3/mm3 Final     RBC   Date Value Ref Range Status   12/05/2023 4.29 3.77 - 5.28 10*6/mm3 Final     Hemoglobin   Date Value Ref Range Status   12/05/2023 12.1 12.0 - 15.9 g/dL Final     Hematocrit   Date Value Ref Range Status   12/05/2023 38.6 34.0 - 46.6 % Final     MCV   Date Value Ref Range Status   12/05/2023 90.0 79.0 - 97.0 fL Final     MCH   Date Value Ref Range Status   12/05/2023 28.2 26.6 - 33.0 pg Final     MCHC   Date Value Ref Range Status   12/05/2023 31.3 (L) 31.5 - 35.7 g/dL Final      RDW   Date Value Ref Range Status   12/05/2023 13.0 12.3 - 15.4 % Final     RDW-SD   Date Value Ref Range Status   12/05/2023 42.0 37.0 - 54.0 fl Final     MPV   Date Value Ref Range Status   12/05/2023 8.8 6.0 - 12.0 fL Final     Platelets   Date Value Ref Range Status   12/05/2023 299 140 - 450 10*3/mm3 Final     Neutrophil %   Date Value Ref Range Status   12/05/2023 56.3 42.7 - 76.0 % Final     Lymphocyte %   Date Value Ref Range Status   12/05/2023 26.5 19.6 - 45.3 % Final     Monocyte %   Date Value Ref Range Status   12/05/2023 9.6 5.0 - 12.0 % Final     Eosinophil %   Date Value Ref Range Status   12/05/2023 6.9 (H) 0.3 - 6.2 % Final     Basophil %   Date Value Ref Range Status   12/05/2023 0.7 0.0 - 1.5 % Final     Neutrophils, Absolute   Date Value Ref Range Status   12/05/2023 3.99 1.70 - 7.00 10*3/mm3 Final     Lymphocytes, Absolute   Date Value Ref Range Status   12/05/2023 1.88 0.70 - 3.10 10*3/mm3 Final     Monocytes, Absolute   Date Value Ref Range Status   12/05/2023 0.68 0.10 - 0.90 10*3/mm3 Final     Eosinophils, Absolute   Date Value Ref Range Status   12/05/2023 0.49 (H) 0.00 - 0.40 10*3/mm3 Final     Basophils, Absolute   Date Value Ref Range Status   12/05/2023 0.05 0.00 - 0.20 10*3/mm3 Final       Lab Results   Component Value Date    GLUCOSE 92 12/05/2023    BUN 19 12/05/2023    CREATININE 0.74 12/05/2023    EGFRIFNONA 82 12/07/2021    BCR 25.7 (H) 12/05/2023    K 4.1 12/05/2023    CO2 29.0 12/05/2023    CALCIUM 9.2 12/05/2023    ALBUMIN 4.2 12/05/2023    LABIL2 1.3 03/13/2019    AST 23 12/05/2023    ALT 27 12/05/2023         ASSESSMENT:    Clinical stage III invasive ductal carcinoma involving the right breast status post right modified mastectomy followed by adjuvant chemotherapy with TAC for eight cycles, followed by adjuvant right chest wall and markie radiation and currently on Femara since November 2009.  So far patient does not have any clinical evidence of relapse disease.   Patient has d/c Femara in 7/2019.  Continue surveillance  Status post left prophylactic mastectomy with benign pathology.  Reviewed.  No chest wall abnormalities identified. Reviewed.  Anemia secondary to iron deficiency and B12 deficiency, she was on B12 injection.  Resolved.  Comprehensive genetic analysis with [facility] was essentially negative for any clinically significant mutation.  He will benefit from upgrade genetic testing which has been ordered today 12/5/2023  Elevated LFTs: Follow up with her PCP.  Patient is on statins.  CMP today  Hypertriglyceridemia.  Primary care physician  Osteopenia, off Prolia now due to discontinuation of AI.   Continued on calcium + vitamin D.   Hematuria.  Work up by Dr. Otilio Tong with [facility].   Osteopenia last bone density was December 2021.  Continue calcium and vitamin D    PLANS:    Her exam was unremarkable  CBC reviewed  Advised follow-up with her PCP for her continued fatigue to evaluate for etiologies outside of anemia.  Upgrade comprehensive gene analysis with [employer] technology today  CMP today  Bone density test will be due  December 2023.  Ordered today.  She will continue Os-Phillip D for osteopenia.  Patient is now off Prolia  Order prosthetic bras.  This has been completed.  Patient is off B12 injections at this time.  Reviewed prior level showing no deficiency.  Consider Evista if patient develops osteoporosis.  Continue monthly bilateral axilla and chest wall self-exam  Follow-up 6 months       I have reviewed labs results, imaging, vitals, and medications with the patient today.  CBC is normal today and was reviewed with the patient.    Lab Results   Component Value Date    WBC 7.09 12/05/2023    HGB 12.1 12/05/2023    HCT 38.6 12/05/2023    MCV 90.0 12/05/2023     12/05/2023     Patient verbalized understanding and is in agreement of the above plan.      I spent 30 total minutes, face-to-face, caring for Tessa today. 90% of this time involved  counseling and/or coordination of care as documented within this note.

## 2023-12-05 ENCOUNTER — OFFICE VISIT (OUTPATIENT)
Dept: ONCOLOGY | Facility: CLINIC | Age: 73
End: 2023-12-05
Payer: MEDICARE

## 2023-12-05 ENCOUNTER — LAB (OUTPATIENT)
Dept: LAB | Facility: HOSPITAL | Age: 73
End: 2023-12-05
Payer: MEDICARE

## 2023-12-05 VITALS
HEART RATE: 96 BPM | HEIGHT: 64 IN | BODY MASS INDEX: 29.53 KG/M2 | RESPIRATION RATE: 18 BRPM | SYSTOLIC BLOOD PRESSURE: 119 MMHG | WEIGHT: 173 LBS | DIASTOLIC BLOOD PRESSURE: 80 MMHG | OXYGEN SATURATION: 96 % | TEMPERATURE: 98 F

## 2023-12-05 DIAGNOSIS — C50.911 MALIGNANT NEOPLASM OF RIGHT FEMALE BREAST, UNSPECIFIED ESTROGEN RECEPTOR STATUS, UNSPECIFIED SITE OF BREAST: Primary | ICD-10-CM

## 2023-12-05 LAB
ALBUMIN SERPL-MCNC: 4.2 G/DL (ref 3.5–5.2)
ALBUMIN/GLOB SERPL: 1.4 G/DL
ALP SERPL-CCNC: 66 U/L (ref 39–117)
ALT SERPL W P-5'-P-CCNC: 27 U/L (ref 1–33)
ANION GAP SERPL CALCULATED.3IONS-SCNC: 9 MMOL/L (ref 5–15)
AST SERPL-CCNC: 23 U/L (ref 1–32)
BASOPHILS # BLD AUTO: 0.05 10*3/MM3 (ref 0–0.2)
BASOPHILS NFR BLD AUTO: 0.7 % (ref 0–1.5)
BILIRUB SERPL-MCNC: 0.3 MG/DL (ref 0–1.2)
BUN SERPL-MCNC: 19 MG/DL (ref 8–23)
BUN/CREAT SERPL: 25.7 (ref 7–25)
CALCIUM SPEC-SCNC: 9.2 MG/DL (ref 8.6–10.5)
CHLORIDE SERPL-SCNC: 102 MMOL/L (ref 98–107)
CO2 SERPL-SCNC: 29 MMOL/L (ref 22–29)
CREAT SERPL-MCNC: 0.74 MG/DL (ref 0.57–1)
DEPRECATED RDW RBC AUTO: 42 FL (ref 37–54)
EGFRCR SERPLBLD CKD-EPI 2021: 86.6 ML/MIN/1.73
EOSINOPHIL # BLD AUTO: 0.49 10*3/MM3 (ref 0–0.4)
EOSINOPHIL NFR BLD AUTO: 6.9 % (ref 0.3–6.2)
ERYTHROCYTE [DISTWIDTH] IN BLOOD BY AUTOMATED COUNT: 13 % (ref 12.3–15.4)
GLOBULIN UR ELPH-MCNC: 3 GM/DL
GLUCOSE SERPL-MCNC: 92 MG/DL (ref 65–99)
HCT VFR BLD AUTO: 38.6 % (ref 34–46.6)
HGB BLD-MCNC: 12.1 G/DL (ref 12–15.9)
HOLD SPECIMEN: NORMAL
HOLD SPECIMEN: NORMAL
LYMPHOCYTES # BLD AUTO: 1.88 10*3/MM3 (ref 0.7–3.1)
LYMPHOCYTES NFR BLD AUTO: 26.5 % (ref 19.6–45.3)
MCH RBC QN AUTO: 28.2 PG (ref 26.6–33)
MCHC RBC AUTO-ENTMCNC: 31.3 G/DL (ref 31.5–35.7)
MCV RBC AUTO: 90 FL (ref 79–97)
MONOCYTES # BLD AUTO: 0.68 10*3/MM3 (ref 0.1–0.9)
MONOCYTES NFR BLD AUTO: 9.6 % (ref 5–12)
NEUTROPHILS NFR BLD AUTO: 3.99 10*3/MM3 (ref 1.7–7)
NEUTROPHILS NFR BLD AUTO: 56.3 % (ref 42.7–76)
PLATELET # BLD AUTO: 299 10*3/MM3 (ref 140–450)
PMV BLD AUTO: 8.8 FL (ref 6–12)
POTASSIUM SERPL-SCNC: 4.1 MMOL/L (ref 3.5–5.2)
PROT SERPL-MCNC: 7.2 G/DL (ref 6–8.5)
RBC # BLD AUTO: 4.29 10*6/MM3 (ref 3.77–5.28)
SODIUM SERPL-SCNC: 140 MMOL/L (ref 136–145)
WBC NRBC COR # BLD AUTO: 7.09 10*3/MM3 (ref 3.4–10.8)

## 2023-12-05 PROCEDURE — 36415 COLL VENOUS BLD VENIPUNCTURE: CPT

## 2023-12-05 PROCEDURE — 1126F AMNT PAIN NOTED NONE PRSNT: CPT | Performed by: INTERNAL MEDICINE

## 2023-12-05 PROCEDURE — 80053 COMPREHEN METABOLIC PANEL: CPT | Performed by: INTERNAL MEDICINE

## 2023-12-05 PROCEDURE — 99214 OFFICE O/P EST MOD 30 MIN: CPT | Performed by: INTERNAL MEDICINE

## 2023-12-05 PROCEDURE — 85025 COMPLETE CBC W/AUTO DIFF WBC: CPT

## 2023-12-26 LAB
REF LAB TEST METHOD: NORMAL
WHOLE BLOOD SORT: NORMAL

## 2024-01-05 ENCOUNTER — HOSPITAL ENCOUNTER (OUTPATIENT)
Dept: BONE DENSITY | Facility: HOSPITAL | Age: 74
Discharge: HOME | End: 2024-01-05
Admitting: NURSE PRACTITIONER
Payer: MEDICARE

## 2024-01-05 DIAGNOSIS — Z78.0 POST-MENOPAUSAL: ICD-10-CM

## 2024-01-05 PROCEDURE — 77080 DXA BONE DENSITY AXIAL: CPT

## 2024-06-05 NOTE — PROGRESS NOTES
Hematology/Oncology Outpatient Follow Up    PATIENT NAME:Tessa Kilpatrick  :1950  MRN: [id number]  PRIMARY CARE PHYSICIAN: Sharona Pineda MD  REFERRING PHYSICIAN: Sharona Pineda MD    Chief Complaint   Patient presents with    Follow-up     Follow up  Malignant neoplasm of right female breast, unspecified estrogen receptor status, unspecified site of breast        HISTORY OF PRESENT ILLNESS:     This is a 73 y.o.female who in  was diagnosed with right breast cancer.  At the time patient was diagnosed with clinical stage III disease and underwent right modified mastectomy for invasive ductal carcinoma measuring 4.2 cm in size.  Seven out of nine lymph nodes were positive for metastatic disease.  Additional lymph node dissection revealed 6 out of 11 lymph nodes were positive for metastatic disease.  Her pathology stage was [id number].  Her right breast cancer was positive for estrogen receptor and negative for progesterone receptor, Ki-67 was positive at 36%.  HER-2/nickie by FISH was not amplified.  In 2009 patient received adjuvant chemotherapy with TAC which is combination of Adriamycin, Cytoxan and Taxotere for a total of eight cycles initiated in 3/26/09 and completed on 09.  This was subsequently followed by adjuvant radiation treatment and currently patient is on Femara since 2009.  She developed abnormal enhancement on her imaging studies and for that reason patient underwent prophylactic left mastectomy.  So far patient has tolerated Femara fairly well.  She was prescribed Arimidex briefly but due to intolerance this was discontinued.  Patient said she had her last bone density test approximately 18 months ago and this was within normal limits.  Patient has since them moved to this area and she wants to establish with medical oncology for continuity of care.    2009 - Patient underwent left simple mastectomy.  Pathology revealed benign breast tissue with  fibrocystic changes and fibroadenoma.    4/22/15 - WBC 6.9, hemoglobin 12.1, platelet count 231,000.  CMP showed normal liver function tests.  BUN 11.  Creatinine 0.6.   10/27/15 - Total cholesterol of 190,  (normal < 100), liver function tests (N).  11/3/15 - Anemia work up:  reticulocyte count (N) 0.91, B12 255, ferritin 78 (N), folate > 24, haptoglobin elevated to 207, LDH (N) 169, TIBC 404, serum iron (N) 55, iron saturation 14.  SPEP with immunofixation assay was negative for monoclonal gammopathy.    2/2/16 - Serum transferrin assay (N) 2.9, methylmalonic acid (N) 170.    EGD and colonoscopy done revealed normal EGD and normal colonoscopy.  Follow up in 10 years was recommended for repeat colonoscopy.   3/24/16 - Comprehensive genetic analysis with [employer] which was essentially negative for any clinically significant mutation.    10/8/16 - Fasting lipid profile which showed a high cholesterol at 230, triglyceride (H) 226, LDL (H) 126.  Results were faxed to Dr. Pineda.  Chemistry panel:  BUN 13, creatinine 0.6.  Liver function tests (N).    12/12/17 - DEXA scan showed osteopenia.   5/14/18 - CBC:  WBC 4.9, hemoglobin 11.8, platelet count 249,000.  Differentials are 53% neutrophils, 29% lymphocytes.  There was no monocytosis, eosinophilia or basophilia.  B12 level was low-normal at 200.  Ferritin 38.  Folate normal 21.  BUN 17.  Creatinine 0.7.    5/16/18 - Urinalysis showed trace blood, otherwise negative.  Urine culture did not grow any significant organisms.    5/16/18 - Patient was placed on iron sulfate 325 mg p.o. daily and B12 injections.  She was also given a referral to GI.    6/13/18 - Patient seen by Dr. Donnelly.  Hemoccult stools were checked.   Urinalysis at the last visit showed small blood.  Urine culture was negative for infection.  Patient was referred to Dr. Otilio Tong who she saw on 11/16/18.  Dr. Tong recommended CT scans of the abdomen and pelvis and also cystoscopy.    12/13/18 -  Fasting lipid panel with total cholesterol of 197, triglyceride 124, HDL was 60, LDL was 112.  7/2019 Completed 10 years Femara treatment   12/27/2019- Patient had bone density which showed osteopenia.  1/5/2024-DEXA scan showing osteopenia    Past Medical History:   Diagnosis Date    Breast cancer     Fibromyalgia     GERD (gastroesophageal reflux disease)     Hypercholesteremia     IBS (irritable bowel syndrome)     Mitral valve prolapse     Osteoarthritis     Seasonal allergies        Past Surgical History:   Procedure Laterality Date    APPENDECTOMY      CATARACT EXTRACTION, BILATERAL      GALLBLADDER SURGERY  10/27/2020    Dr. Lima     GANGLION CYST EXCISION      wrist    HYSTERECTOMY      MASTECTOMY           Current Outpatient Medications:     aspirin 81 MG EC tablet, Take 1 tablet by mouth Daily., Disp: , Rfl:     diclofenac (VOLTAREN) 75 MG EC tablet, Take 1 tablet by mouth 2 (Two) Times a Day., Disp: , Rfl: 4    dicyclomine (BENTYL) 20 MG tablet, , Disp: , Rfl:     dilTIAZem (TIAZAC) 240 MG 24 hr capsule, 1 capsule Daily., Disp: , Rfl:     escitalopram (LEXAPRO) 10 MG tablet, Take 1 tablet by mouth Daily., Disp: , Rfl: 4    hyoscyamine (LEVSIN) 0.125 MG SL tablet, DISSOLVE 1 TABLET UNDER TONGUE EVERY 4 HOURS AS NEEDED FOR STOMACH CRAMPS, Disp: , Rfl:     levocetirizine (XYZAL) 5 MG tablet, Take 1 tablet by mouth Every Evening., Disp: , Rfl:     omeprazole (priLOSEC) 40 MG capsule, TAKE 1 CAPSULE BY MOUTH ONCE DAILY FOR REFLUX, Disp: , Rfl: 12    pravastatin (PRAVACHOL) 80 MG tablet, Take 1 tablet by mouth Daily., Disp: , Rfl: 4    cetirizine (zyrTEC) 10 MG tablet, Take 1 tablet by mouth Daily. (Patient not taking: Reported on 6/11/2024), Disp: , Rfl:     montelukast (SINGULAIR) 10 MG tablet, Take 1 tablet by mouth Daily. (Patient not taking: Reported on 6/11/2024), Disp: , Rfl: 3    Allergies   Allergen Reactions    Hydrocodone-Acetaminophen Anaphylaxis    Atenolol Unknown (See Comments)     Not known      Codeine Unknown (See Comments)     Compounded drug     Fiorinal [Butalbital-Aspirin-Caffeine] Unknown (See Comments)     Not known     Morphine Unknown (See Comments)     Unknown     Valium [Diazepam] Unknown (See Comments)     Not known     Erythromycin Rash       Family History   Problem Relation Age of Onset    Breast cancer Paternal Aunt 75       Cancer-related family history includes Breast cancer (age of onset: 75) in her paternal aunt.    Social History     Tobacco Use    Smoking status: Former     Types: Cigarettes    Tobacco comments:     40 years ago    Vaping Use    Vaping status: Never Used   Substance Use Topics    Alcohol use: No    Drug use: No     I have reviewed and confirmed the accuracy of the patient's history: Chief complaint, HPI, ROS, and Subjective as entered by the MA/LPN/RN.        SUBJECTIVE:  Tessa is here today for her routine follow-up.  She reports that she is doing well.  She has noted some feelings of tightness in her right upper extremity and some swelling in her hand.        REVIEW OF SYSTEMS:    Review of Systems   Constitutional:  Positive for fatigue. Negative for chills and fever.   HENT:  Negative for ear pain, mouth sores, nosebleeds and sore throat.    Eyes:  Negative for photophobia and visual disturbance.        Wears glasses    Respiratory:  Negative for wheezing and stridor.         Snoring at night    Cardiovascular:  Negative for chest pain and palpitations.   Gastrointestinal:  Negative for abdominal pain, diarrhea, nausea and vomiting.   Endocrine: Negative for cold intolerance and heat intolerance.   Genitourinary:  Negative for dysuria and hematuria.   Musculoskeletal:  Negative for joint swelling and neck stiffness.   Skin:  Negative for color change and rash.   Neurological:  Negative for dizziness, seizures, syncope and light-headedness.   Hematological:  Negative for adenopathy.        No obvious bleeding   Psychiatric/Behavioral:  Negative for agitation,  "confusion and hallucinations.    All other systems reviewed and are negative.    OBJECTIVE:    Vitals:    06/11/24 0946   BP: 129/82   Pulse: 96   SpO2: 97%   Weight: 77.2 kg (170 lb 3.2 oz)   Height: 162.6 cm (64\")   PainSc: 0-No pain           ECOG    (0) Fully active, able to carry on all predisease performance without restriction    Physical Exam   Constitutional: She is oriented to person, place, and time. No distress.   HENT:   Head: Normocephalic and atraumatic.   Eyes: Conjunctivae are normal. Right eye exhibits no discharge. Left eye exhibits no discharge. No scleral icterus.   Neck: No thyromegaly present.   Cardiovascular: Normal rate, regular rhythm and normal heart sounds. Exam reveals no gallop and no friction rub.   Pulmonary/Chest: Effort normal. No stridor. No respiratory distress. She has no wheezes. She exhibits no mass, no tenderness, no edema and no swelling.   Bilateral mastectomy - chest wall exam was negative  Bilateral axillary exam was negative   Abdominal: Soft. Bowel sounds are normal. She exhibits no mass. There is no abdominal tenderness. There is no rebound and no guarding.   Musculoskeletal: Normal range of motion. No tenderness.   Lymphadenopathy:     She has no cervical adenopathy.   Neurological: She is alert and oriented to person, place, and time. She exhibits normal muscle tone.   Skin: Skin is warm. No rash noted. She is not diaphoretic. No erythema.   Psychiatric: Her behavior is normal.   Nursing note and vitals reviewed.    I have reexamined the patient and the results are consistent with the previously documented exam.       RECENT LABS     WBC   Date Value Ref Range Status   06/11/2024 7.16 3.40 - 10.80 10*3/mm3 Final     RBC   Date Value Ref Range Status   06/11/2024 4.35 3.77 - 5.28 10*6/mm3 Final     Hemoglobin   Date Value Ref Range Status   06/11/2024 12.1 12.0 - 15.9 g/dL Final     Hematocrit   Date Value Ref Range Status   06/11/2024 38.5 34.0 - 46.6 % Final     MCV "   Date Value Ref Range Status   06/11/2024 88.5 79.0 - 97.0 fL Final     MCH   Date Value Ref Range Status   06/11/2024 27.8 26.6 - 33.0 pg Final     MCHC   Date Value Ref Range Status   06/11/2024 31.4 (L) 31.5 - 35.7 g/dL Final     RDW   Date Value Ref Range Status   06/11/2024 14.2 12.3 - 15.4 % Final     RDW-SD   Date Value Ref Range Status   06/11/2024 44.9 37.0 - 54.0 fl Final     MPV   Date Value Ref Range Status   06/11/2024 9.1 6.0 - 12.0 fL Final     Platelets   Date Value Ref Range Status   06/11/2024 315 140 - 450 10*3/mm3 Final     Neutrophil %   Date Value Ref Range Status   06/11/2024 55.6 42.7 - 76.0 % Final     Lymphocyte %   Date Value Ref Range Status   06/11/2024 29.6 19.6 - 45.3 % Final     Monocyte %   Date Value Ref Range Status   06/11/2024 9.1 5.0 - 12.0 % Final     Eosinophil %   Date Value Ref Range Status   06/11/2024 4.7 0.3 - 6.2 % Final     Basophil %   Date Value Ref Range Status   06/11/2024 1.0 0.0 - 1.5 % Final     Neutrophils, Absolute   Date Value Ref Range Status   06/11/2024 3.98 1.70 - 7.00 10*3/mm3 Final     Lymphocytes, Absolute   Date Value Ref Range Status   06/11/2024 2.12 0.70 - 3.10 10*3/mm3 Final     Monocytes, Absolute   Date Value Ref Range Status   06/11/2024 0.65 0.10 - 0.90 10*3/mm3 Final     Eosinophils, Absolute   Date Value Ref Range Status   06/11/2024 0.34 0.00 - 0.40 10*3/mm3 Final     Basophils, Absolute   Date Value Ref Range Status   06/11/2024 0.07 0.00 - 0.20 10*3/mm3 Final       Lab Results   Component Value Date    GLUCOSE 92 12/05/2023    BUN 19 12/05/2023    CREATININE 0.74 12/05/2023    EGFRIFNONA 82 12/07/2021    BCR 25.7 (H) 12/05/2023    K 4.1 12/05/2023    CO2 29.0 12/05/2023    CALCIUM 9.2 12/05/2023    ALBUMIN 4.2 12/05/2023    LABIL2 1.3 03/13/2019    AST 23 12/05/2023    ALT 27 12/05/2023         ASSESSMENT:    Clinical stage III invasive ductal carcinoma involving the right breast status post right modified mastectomy followed by adjuvant  chemotherapy with TAC for eight cycles, followed by adjuvant right chest wall and markie radiation and currently on Femara since November 2009.  So far patient does not have any clinical evidence of relapse disease.  Patient has d/c Femara in 7/2019.  Continue surveillance  Status post left prophylactic mastectomy with benign pathology.  Reviewed.  No chest wall abnormalities identified. Reviewed.  Anemia secondary to iron deficiency and B12 deficiency, she was on B12 injection.  Resolved.  Comprehensive genetic analysis with [employer] was essentially negative for any clinically significant mutation.  He will benefit from upgrade genetic testing which has been ordered today 12/5/2023.  Variant of unknown significance detected.  BLM P.  .  Elevated LFTs: Follow up with her PCP.  Patient is on statins.  CMP today  Hypertriglyceridemia.  Primary care physician  Osteopenia, off Prolia now due to discontinuation of AI.   Continued on calcium + vitamin D.   Hematuria.  Work up by Dr. Otilio Tong with [facility].   Osteopenia last bone density was December 2021.  Continue calcium and vitamin D    PLANS:    Her exam was unremarkable  Referral to lymphedema clinic at [facility]  CBC reviewed today with the patient  Upgrade comprehensive gene analysis with [employer] has been completed  CMP ordered  Bone density test will be due January 2026.  She will continue Os-Phillip D for osteopenia.  Patient is now off Prolia  Order prosthetic bras.  This has been completed.  Patient is off B12 injections at this time.  Reviewed prior level showing no deficiency.  Consider Evista if patient develops osteoporosis.  Continue monthly bilateral axilla and chest wall self-exam  Follow-up 6 months with Dr. Jacob or sooner if needed       I have reviewed labs results, imaging, vitals, and medications with the patient today.  CBC is normal today and was reviewed with the patient.    Lab Results   Component Value Date    WBC 7.16  06/11/2024    HGB 12.1 06/11/2024    HCT 38.5 06/11/2024    MCV 88.5 06/11/2024     06/11/2024     Patient verbalized understanding and is in agreement of the above plan.      I spent 30 total minutes, face-to-face, caring for Tessa today. 90% of this time involved counseling and/or coordination of care as documented within this note.

## 2024-06-11 ENCOUNTER — OFFICE VISIT (OUTPATIENT)
Dept: ONCOLOGY | Facility: CLINIC | Age: 74
End: 2024-06-11
Payer: MEDICARE

## 2024-06-11 ENCOUNTER — LAB (OUTPATIENT)
Dept: LAB | Facility: HOSPITAL | Age: 74
End: 2024-06-11
Payer: MEDICARE

## 2024-06-11 VITALS
SYSTOLIC BLOOD PRESSURE: 129 MMHG | DIASTOLIC BLOOD PRESSURE: 82 MMHG | HEIGHT: 64 IN | WEIGHT: 170.2 LBS | OXYGEN SATURATION: 97 % | HEART RATE: 96 BPM | BODY MASS INDEX: 29.06 KG/M2

## 2024-06-11 DIAGNOSIS — C50.911 MALIGNANT NEOPLASM OF RIGHT FEMALE BREAST, UNSPECIFIED ESTROGEN RECEPTOR STATUS, UNSPECIFIED SITE OF BREAST: Primary | ICD-10-CM

## 2024-06-11 DIAGNOSIS — I89.0 LYMPHEDEMA: ICD-10-CM

## 2024-06-11 LAB
ALBUMIN SERPL-MCNC: 4.5 G/DL (ref 3.5–5.2)
ALBUMIN/GLOB SERPL: 1.7 G/DL
ALP SERPL-CCNC: 72 U/L (ref 39–117)
ALT SERPL W P-5'-P-CCNC: 45 U/L (ref 1–33)
ANION GAP SERPL CALCULATED.3IONS-SCNC: 11.2 MMOL/L (ref 5–15)
AST SERPL-CCNC: 42 U/L (ref 1–32)
BASOPHILS # BLD AUTO: 0.07 10*3/MM3 (ref 0–0.2)
BASOPHILS NFR BLD AUTO: 1 % (ref 0–1.5)
BILIRUB SERPL-MCNC: 0.4 MG/DL (ref 0–1.2)
BUN SERPL-MCNC: 13 MG/DL (ref 8–23)
BUN/CREAT SERPL: 16 (ref 7–25)
CALCIUM SPEC-SCNC: 9.4 MG/DL (ref 8.6–10.5)
CHLORIDE SERPL-SCNC: 102 MMOL/L (ref 98–107)
CO2 SERPL-SCNC: 26.8 MMOL/L (ref 22–29)
CREAT SERPL-MCNC: 0.81 MG/DL (ref 0.57–1)
DEPRECATED RDW RBC AUTO: 44.9 FL (ref 37–54)
EGFRCR SERPLBLD CKD-EPI 2021: 77.7 ML/MIN/1.73
EOSINOPHIL # BLD AUTO: 0.34 10*3/MM3 (ref 0–0.4)
EOSINOPHIL NFR BLD AUTO: 4.7 % (ref 0.3–6.2)
ERYTHROCYTE [DISTWIDTH] IN BLOOD BY AUTOMATED COUNT: 14.2 % (ref 12.3–15.4)
GLOBULIN UR ELPH-MCNC: 2.6 GM/DL
GLUCOSE SERPL-MCNC: 83 MG/DL (ref 65–99)
HCT VFR BLD AUTO: 38.5 % (ref 34–46.6)
HGB BLD-MCNC: 12.1 G/DL (ref 12–15.9)
HOLD SPECIMEN: NORMAL
HOLD SPECIMEN: NORMAL
LYMPHOCYTES # BLD AUTO: 2.12 10*3/MM3 (ref 0.7–3.1)
LYMPHOCYTES NFR BLD AUTO: 29.6 % (ref 19.6–45.3)
MCH RBC QN AUTO: 27.8 PG (ref 26.6–33)
MCHC RBC AUTO-ENTMCNC: 31.4 G/DL (ref 31.5–35.7)
MCV RBC AUTO: 88.5 FL (ref 79–97)
MONOCYTES # BLD AUTO: 0.65 10*3/MM3 (ref 0.1–0.9)
MONOCYTES NFR BLD AUTO: 9.1 % (ref 5–12)
NEUTROPHILS NFR BLD AUTO: 3.98 10*3/MM3 (ref 1.7–7)
NEUTROPHILS NFR BLD AUTO: 55.6 % (ref 42.7–76)
PLATELET # BLD AUTO: 315 10*3/MM3 (ref 140–450)
PMV BLD AUTO: 9.1 FL (ref 6–12)
POTASSIUM SERPL-SCNC: 4 MMOL/L (ref 3.5–5.2)
PROT SERPL-MCNC: 7.1 G/DL (ref 6–8.5)
RBC # BLD AUTO: 4.35 10*6/MM3 (ref 3.77–5.28)
SODIUM SERPL-SCNC: 140 MMOL/L (ref 136–145)
WBC NRBC COR # BLD AUTO: 7.16 10*3/MM3 (ref 3.4–10.8)

## 2024-06-11 PROCEDURE — G2211 COMPLEX E/M VISIT ADD ON: HCPCS | Performed by: NURSE PRACTITIONER

## 2024-06-11 PROCEDURE — 1126F AMNT PAIN NOTED NONE PRSNT: CPT | Performed by: NURSE PRACTITIONER

## 2024-06-11 PROCEDURE — 85025 COMPLETE CBC W/AUTO DIFF WBC: CPT | Performed by: NURSE PRACTITIONER

## 2024-06-11 PROCEDURE — 80053 COMPREHEN METABOLIC PANEL: CPT | Performed by: NURSE PRACTITIONER

## 2024-06-11 PROCEDURE — 1159F MED LIST DOCD IN RCRD: CPT | Performed by: NURSE PRACTITIONER

## 2024-06-11 PROCEDURE — 1160F RVW MEDS BY RX/DR IN RCRD: CPT | Performed by: NURSE PRACTITIONER

## 2024-06-11 PROCEDURE — 99214 OFFICE O/P EST MOD 30 MIN: CPT | Performed by: NURSE PRACTITIONER

## 2024-06-11 PROCEDURE — 36415 COLL VENOUS BLD VENIPUNCTURE: CPT

## 2024-06-11 RX ORDER — ASPIRIN 81 MG/1
81 TABLET ORAL DAILY
COMMUNITY

## 2024-12-10 ENCOUNTER — OFFICE VISIT (OUTPATIENT)
Dept: ONCOLOGY | Facility: CLINIC | Age: 74
End: 2024-12-10
Payer: MEDICARE

## 2024-12-10 ENCOUNTER — LAB (OUTPATIENT)
Dept: LAB | Facility: HOSPITAL | Age: 74
End: 2024-12-10
Payer: MEDICARE

## 2024-12-10 VITALS
DIASTOLIC BLOOD PRESSURE: 86 MMHG | BODY MASS INDEX: 29.37 KG/M2 | WEIGHT: 172 LBS | RESPIRATION RATE: 18 BRPM | HEART RATE: 96 BPM | OXYGEN SATURATION: 96 % | HEIGHT: 64 IN | SYSTOLIC BLOOD PRESSURE: 143 MMHG | TEMPERATURE: 97.9 F

## 2024-12-10 DIAGNOSIS — C50.911 MALIGNANT NEOPLASM OF RIGHT FEMALE BREAST, UNSPECIFIED ESTROGEN RECEPTOR STATUS, UNSPECIFIED SITE OF BREAST: Primary | ICD-10-CM

## 2024-12-10 LAB
BASOPHILS # BLD AUTO: 0.09 10*3/MM3 (ref 0–0.2)
BASOPHILS NFR BLD AUTO: 1.5 % (ref 0–1.5)
DEPRECATED RDW RBC AUTO: 43.3 FL (ref 37–54)
EOSINOPHIL # BLD AUTO: 0.37 10*3/MM3 (ref 0–0.4)
EOSINOPHIL NFR BLD AUTO: 6.3 % (ref 0.3–6.2)
ERYTHROCYTE [DISTWIDTH] IN BLOOD BY AUTOMATED COUNT: 13.6 % (ref 12.3–15.4)
HCT VFR BLD AUTO: 40.3 % (ref 34–46.6)
HGB BLD-MCNC: 12.6 G/DL (ref 12–15.9)
HOLD SPECIMEN: NORMAL
HOLD SPECIMEN: NORMAL
LYMPHOCYTES # BLD AUTO: 1.67 10*3/MM3 (ref 0.7–3.1)
LYMPHOCYTES NFR BLD AUTO: 28.4 % (ref 19.6–45.3)
MCH RBC QN AUTO: 27.6 PG (ref 26.6–33)
MCHC RBC AUTO-ENTMCNC: 31.3 G/DL (ref 31.5–35.7)
MCV RBC AUTO: 88.4 FL (ref 79–97)
MONOCYTES # BLD AUTO: 0.56 10*3/MM3 (ref 0.1–0.9)
MONOCYTES NFR BLD AUTO: 9.5 % (ref 5–12)
NEUTROPHILS NFR BLD AUTO: 3.19 10*3/MM3 (ref 1.7–7)
NEUTROPHILS NFR BLD AUTO: 54.3 % (ref 42.7–76)
PLATELET # BLD AUTO: 310 10*3/MM3 (ref 140–450)
PMV BLD AUTO: 9.2 FL (ref 6–12)
RBC # BLD AUTO: 4.56 10*6/MM3 (ref 3.77–5.28)
WBC NRBC COR # BLD AUTO: 5.88 10*3/MM3 (ref 3.4–10.8)

## 2024-12-10 PROCEDURE — 36415 COLL VENOUS BLD VENIPUNCTURE: CPT

## 2024-12-10 PROCEDURE — 85025 COMPLETE CBC W/AUTO DIFF WBC: CPT

## 2024-12-10 NOTE — PROGRESS NOTES
Hematology/Oncology Outpatient Follow Up    PATIENT NAME:Tessa Kilpatrick  :1950  MRN: [id number]  PRIMARY CARE PHYSICIAN: Sharona Pineda MD  REFERRING PHYSICIAN: Sharona Pineda MD    Chief Complaint   Patient presents with    Follow-up     Malignant neoplasm of right female breast, unspecified estrogen receptor status, unspecified site of breast            HISTORY OF PRESENT ILLNESS:     This is a 74 y.o.female who in  was diagnosed with right breast cancer.  At the time patient was diagnosed with clinical stage III disease and underwent right modified mastectomy for invasive ductal carcinoma measuring 4.2 cm in size.  Seven out of nine lymph nodes were positive for metastatic disease.  Additional lymph node dissection revealed 6 out of 11 lymph nodes were positive for metastatic disease.  Her pathology stage was T2N3Mx.  Her right breast cancer was positive for estrogen receptor and negative for progesterone receptor, Ki-67 was positive at 36%.  HER-2/nickie by FISH was not amplified.  In 2009 patient received adjuvant chemotherapy with TAC which is combination of Adriamycin, Cytoxan and Taxotere for a total of eight cycles initiated in 3/26/09 and completed on 09.  This was subsequently followed by adjuvant radiation treatment and currently patient is on Femara since 2009.  She developed abnormal enhancement on her imaging studies and for that reason patient underwent prophylactic left mastectomy.  So far patient has tolerated Femara fairly well.  She was prescribed Arimidex briefly but due to intolerance this was discontinued.  Patient said she had her last bone density test approximately 18 months ago and this was within normal limits.  Patient has since them moved to this area and she wants to establish with medical oncology for continuity of care.    2009 - Patient underwent left simple mastectomy.  Pathology revealed benign breast tissue with fibrocystic  changes and fibroadenoma.    4/22/15 - WBC 6.9, hemoglobin 12.1, platelet count 231,000.  CMP showed normal liver function tests.  BUN 11.  Creatinine 0.6.   10/27/15 - Total cholesterol of 190,  (normal < 100), liver function tests (N).  11/3/15 - Anemia work up:  reticulocyte count (N) 0.91, B12 255, ferritin 78 (N), folate > 24, haptoglobin elevated to 207, LDH (N) 169, TIBC 404, serum iron (N) 55, iron saturation 14.  SPEP with immunofixation assay was negative for monoclonal gammopathy.    2/2/16 - Serum transferrin assay (N) 2.9, methylmalonic acid (N) 170.    EGD and colonoscopy done revealed normal EGD and normal colonoscopy.  Follow up in 10 years was recommended for repeat colonoscopy.   3/24/16 - Comprehensive genetic analysis with [employer] which was essentially negative for any clinically significant mutation.    10/8/16 - Fasting lipid profile which showed a high cholesterol at 230, triglyceride (H) 226, LDL (H) 126.  Results were faxed to Dr. Pineda.  Chemistry panel:  BUN 13, creatinine 0.6.  Liver function tests (N).    12/12/17 - DEXA scan showed osteopenia.   5/14/18 - CBC:  WBC 4.9, hemoglobin 11.8, platelet count 249,000.  Differentials are 53% neutrophils, 29% lymphocytes.  There was no monocytosis, eosinophilia or basophilia.  B12 level was low-normal at 200.  Ferritin 38.  Folate normal 21.  BUN 17.  Creatinine 0.7.    5/16/18 - Urinalysis showed trace blood, otherwise negative.  Urine culture did not grow any significant organisms.    5/16/18 - Patient was placed on iron sulfate 325 mg p.o. daily and B12 injections.  She was also given a referral to GI.    6/13/18 - Patient seen by Dr. Donnelly.  Hemoccult stools were checked.   Urinalysis at the last visit showed small blood.  Urine culture was negative for infection.  Patient was referred to Dr. Otilio Tong who she saw on 11/16/18.  Dr. Tong recommended CT scans of the abdomen and pelvis and also cystoscopy.    12/13/18 - Fasting lipid  panel with total cholesterol of 197, triglyceride 124, HDL was 60, LDL was 112.  7/2019 Completed 10 years Femara treatment   12/27/2019- Patient had bone density which showed osteopenia.  1/5/2024-DEXA scan showing osteopenia    Past Medical History:   Diagnosis Date    Breast cancer     Fibromyalgia     GERD (gastroesophageal reflux disease)     Hypercholesteremia     IBS (irritable bowel syndrome)     Mitral valve prolapse     Osteoarthritis     Seasonal allergies        Past Surgical History:   Procedure Laterality Date    APPENDECTOMY      CATARACT EXTRACTION, BILATERAL      GALLBLADDER SURGERY  10/27/2020    Dr. Lima     GANGLION CYST EXCISION      wrist    HYSTERECTOMY      MASTECTOMY           Current Outpatient Medications:     cetirizine (zyrTEC) 10 MG tablet, Take 1 tablet by mouth Daily. (Patient not taking: Reported on 12/10/2024), Disp: , Rfl:     diclofenac (VOLTAREN) 75 MG EC tablet, Take 1 tablet by mouth 2 (Two) Times a Day., Disp: , Rfl: 4    dicyclomine (BENTYL) 20 MG tablet, , Disp: , Rfl:     dilTIAZem (TIAZAC) 240 MG 24 hr capsule, 1 capsule Daily., Disp: , Rfl:     escitalopram (LEXAPRO) 10 MG tablet, Take 1 tablet by mouth Daily., Disp: , Rfl: 4    hyoscyamine (LEVSIN) 0.125 MG SL tablet, DISSOLVE 1 TABLET UNDER TONGUE EVERY 4 HOURS AS NEEDED FOR STOMACH CRAMPS, Disp: , Rfl:     levocetirizine (XYZAL) 5 MG tablet, Take 1 tablet by mouth Every Evening., Disp: , Rfl:     montelukast (SINGULAIR) 10 MG tablet, Take 1 tablet by mouth Daily. (Patient not taking: Reported on 12/10/2024), Disp: , Rfl: 3    omeprazole (priLOSEC) 40 MG capsule, TAKE 1 CAPSULE BY MOUTH ONCE DAILY FOR REFLUX, Disp: , Rfl: 12    pravastatin (PRAVACHOL) 80 MG tablet, Take 1 tablet by mouth Daily., Disp: , Rfl: 4    Allergies   Allergen Reactions    Hydrocodone-Acetaminophen Anaphylaxis    Atenolol Unknown (See Comments)     Not known     Codeine Unknown (See Comments)     Compounded drug     Fiorinal  [Butalbital-Aspirin-Caffeine] Unknown (See Comments)     Not known     Morphine Unknown (See Comments)     Unknown     Valium [Diazepam] Unknown (See Comments)     Not known     Erythromycin Rash       Family History   Problem Relation Age of Onset    Breast cancer Paternal Aunt 75       Cancer-related family history includes Breast cancer (age of onset: 75) in her paternal aunt.    Social History     Tobacco Use    Smoking status: Former     Types: Cigarettes    Tobacco comments:     40 years ago    Vaping Use    Vaping status: Never Used   Substance Use Topics    Alcohol use: No    Drug use: No      I have reviewed and confirmed the accuracy of the patient's history: Chief complaint, HPI, ROS, and Subjective as entered by the MA/LPN/RN. Elisa Jacob MD 12/10/24        SUBJECTIVE:      Has developed right upper extremity lymphedema currently undergoing physical therapy.        REVIEW OF SYSTEMS:    Review of Systems   Constitutional:  Positive for fatigue. Negative for chills and fever.   HENT:  Negative for ear pain, mouth sores, nosebleeds and sore throat.    Eyes:  Negative for photophobia and visual disturbance.        Wears glasses    Respiratory:  Negative for wheezing and stridor.         Snoring at night    Cardiovascular:  Negative for chest pain and palpitations.   Gastrointestinal:  Negative for abdominal pain, diarrhea, nausea and vomiting.   Endocrine: Negative for cold intolerance and heat intolerance.   Genitourinary:  Negative for dysuria and hematuria.   Musculoskeletal:  Negative for joint swelling and neck stiffness.   Skin:  Negative for color change and rash.   Neurological:  Negative for dizziness, seizures, syncope and light-headedness.   Hematological:  Negative for adenopathy.        No obvious bleeding   Psychiatric/Behavioral:  Negative for agitation, confusion and hallucinations.    All other systems reviewed and are negative.    OBJECTIVE:    Vitals:    12/10/24 0946   BP:  "143/86   Pulse: 96   Resp: 18   Temp: 97.9 °F (36.6 °C)   TempSrc: Infrared   SpO2: 96%   Weight: 78 kg (172 lb)   Height: 162.6 cm (64\")   PainSc: 0-No pain             ECOG    (0) Fully active, able to carry on all predisease performance without restriction    Physical Exam   Constitutional: She is oriented to person, place, and time. No distress.   HENT:   Head: Normocephalic and atraumatic.   Eyes: Conjunctivae are normal. Right eye exhibits no discharge. Left eye exhibits no discharge. No scleral icterus.   Neck: No thyromegaly present.   Cardiovascular: Normal rate, regular rhythm and normal heart sounds. Exam reveals no gallop and no friction rub.   Pulmonary/Chest: Effort normal. No stridor. No respiratory distress. She has no wheezes. She exhibits no mass, no tenderness, no edema and no swelling.   Bilateral mastectomy - chest wall exam was negative  Bilateral axillary exam was negative   Abdominal: Soft. Bowel sounds are normal. She exhibits no mass. There is no abdominal tenderness. There is no rebound and no guarding.   Musculoskeletal: Normal range of motion. No tenderness.   Lymphadenopathy:     She has no cervical adenopathy.   Neurological: She is alert and oriented to person, place, and time. She exhibits normal muscle tone.   Skin: Skin is warm. No rash noted. She is not diaphoretic. No erythema.   Psychiatric: Her behavior is normal.   Nursing note and vitals reviewed.      I have reexamined the patient and the results are consistent with the previously documented exam. Elisa Jacob MD        RECENT LABS     WBC   Date Value Ref Range Status   12/10/2024 5.88 3.40 - 10.80 10*3/mm3 Final     RBC   Date Value Ref Range Status   12/10/2024 4.56 3.77 - 5.28 10*6/mm3 Final     Hemoglobin   Date Value Ref Range Status   12/10/2024 12.6 12.0 - 15.9 g/dL Final     Hematocrit   Date Value Ref Range Status   12/10/2024 40.3 34.0 - 46.6 % Final     MCV   Date Value Ref Range Status   12/10/2024 " 88.4 79.0 - 97.0 fL Final     MCH   Date Value Ref Range Status   12/10/2024 27.6 26.6 - 33.0 pg Final     MCHC   Date Value Ref Range Status   12/10/2024 31.3 (L) 31.5 - 35.7 g/dL Final     RDW   Date Value Ref Range Status   12/10/2024 13.6 12.3 - 15.4 % Final     RDW-SD   Date Value Ref Range Status   12/10/2024 43.3 37.0 - 54.0 fl Final     MPV   Date Value Ref Range Status   12/10/2024 9.2 6.0 - 12.0 fL Final     Platelets   Date Value Ref Range Status   12/10/2024 310 140 - 450 10*3/mm3 Final     Neutrophil %   Date Value Ref Range Status   12/10/2024 54.3 42.7 - 76.0 % Final     Lymphocyte %   Date Value Ref Range Status   12/10/2024 28.4 19.6 - 45.3 % Final     Monocyte %   Date Value Ref Range Status   12/10/2024 9.5 5.0 - 12.0 % Final     Eosinophil %   Date Value Ref Range Status   12/10/2024 6.3 (H) 0.3 - 6.2 % Final     Basophil %   Date Value Ref Range Status   12/10/2024 1.5 0.0 - 1.5 % Final     Neutrophils, Absolute   Date Value Ref Range Status   12/10/2024 3.19 1.70 - 7.00 10*3/mm3 Final     Lymphocytes, Absolute   Date Value Ref Range Status   12/10/2024 1.67 0.70 - 3.10 10*3/mm3 Final     Monocytes, Absolute   Date Value Ref Range Status   12/10/2024 0.56 0.10 - 0.90 10*3/mm3 Final     Eosinophils, Absolute   Date Value Ref Range Status   12/10/2024 0.37 0.00 - 0.40 10*3/mm3 Final     Basophils, Absolute   Date Value Ref Range Status   12/10/2024 0.09 0.00 - 0.20 10*3/mm3 Final       Lab Results   Component Value Date    GLUCOSE 83 06/11/2024    BUN 13 06/11/2024    CREATININE 0.81 06/11/2024    EGFRIFNONA 82 12/07/2021    BCR 16.0 06/11/2024    K 4.0 06/11/2024    CO2 26.8 06/11/2024    CALCIUM 9.4 06/11/2024    ALBUMIN 4.5 06/11/2024    LABIL2 1.3 03/13/2019    AST 42 (H) 06/11/2024    ALT 45 (H) 06/11/2024         ASSESSMENT:    Clinical stage III invasive ductal carcinoma involving the right breast status post right modified mastectomy followed by adjuvant chemotherapy with TAC for eight  cycles, followed by adjuvant right chest wall and markie radiation and currently on Femara since November 2009.  So far patient does not have any clinical evidence of relapse disease.  Patient has d/c Femara in 7/2019.  Continue surveillance  Status post left prophylactic mastectomy with benign pathology.  No chest wall abnormalities today  Anemia secondary to iron deficiency and B12 deficiency, she was on B12 injection.  CBC reviewed  Comprehensive genetic analysis with [facility] was essentially negative for any clinically significant mutation.  He will benefit from upgrade genetic testing which has been ordered today 12/5/2023.  Variant of unknown significance detected.  BLM P.  .  Elevated LFTs: Follow up with her PCP.  Patient is on statins.  CMP today  Hypertriglyceridemia.  Primary care physician  Osteopenia, off Prolia now due to discontinuation of AI.   Continued on calcium + vitamin D.   Hematuria.  Work up by Dr. Otilio Tong with [facility].   Osteopenia last bone density was December 2021.  Continue calcium and vitamin D    PLANS:    As above  Referral to lymphedema clinic at [facility]  CBC reviewed today with the patient  Upgrade comprehensive gene analysis with [employer] has been completed  CMP ordered  Bone density test will be due January 2026.  She will continue Os-Phillip D for osteopenia.  Patient is now off Prolia  Order prosthetic bras.  This has been completed.  Patient is off B12 injections at this time.  Reviewed prior level showing no deficiency.  Consider Evista if patient develops osteoporosis.  Continue monthly bilateral axilla and chest wall self-exam  Follow-up 1 year or earlier as needed  Follow-up with her PCP for her ongoing medical issues       I have reviewed labs results, imaging, vitals, and medications with the patient today.  CBC is normal today and was reviewed with the patient.    Lab Results   Component Value Date    WBC 5.88 12/10/2024    HGB 12.6 12/10/2024    HCT  40.3 12/10/2024    MCV 88.4 12/10/2024     12/10/2024     Patient verbalized understanding and is in agreement of the above plan.       Electronically signed by Elisa Jacob MD, 12/10/24, 5:16 PM EST.